# Patient Record
Sex: FEMALE | Race: WHITE | NOT HISPANIC OR LATINO | Employment: FULL TIME | URBAN - METROPOLITAN AREA
[De-identification: names, ages, dates, MRNs, and addresses within clinical notes are randomized per-mention and may not be internally consistent; named-entity substitution may affect disease eponyms.]

---

## 2019-12-27 ENCOUNTER — TRANSCRIBE ORDERS (OUTPATIENT)
Dept: OTHER | Facility: OTHER | Age: 39
End: 2019-12-27

## 2019-12-27 DIAGNOSIS — R51.9 NONINTRACTABLE HEADACHE, UNSPECIFIED CHRONICITY PATTERN, UNSPECIFIED HEADACHE TYPE: ICD-10-CM

## 2019-12-27 DIAGNOSIS — R51.9 ACUTE NONINTRACTABLE HEADACHE, UNSPECIFIED HEADACHE TYPE: Primary | ICD-10-CM

## 2020-01-09 ENCOUNTER — HOSPITAL ENCOUNTER (OUTPATIENT)
Dept: MRI IMAGING | Facility: HOSPITAL | Age: 40
Discharge: HOME | End: 2020-01-09
Admitting: FAMILY MEDICINE

## 2020-01-09 DIAGNOSIS — R51.9 ACUTE NONINTRACTABLE HEADACHE, UNSPECIFIED HEADACHE TYPE: ICD-10-CM

## 2020-01-09 PROCEDURE — 70551 MRI BRAIN STEM W/O DYE: CPT | Performed by: RADIOLOGY

## 2020-01-09 PROCEDURE — 70551 MRI BRAIN STEM W/O DYE: CPT

## 2023-03-01 ENCOUNTER — OFFICE VISIT (OUTPATIENT)
Dept: PSYCHIATRY | Facility: CLINIC | Age: 43
End: 2023-03-01
Payer: COMMERCIAL

## 2023-03-01 VITALS
BODY MASS INDEX: 21.66 KG/M2 | HEIGHT: 65 IN | DIASTOLIC BLOOD PRESSURE: 70 MMHG | WEIGHT: 130 LBS | HEART RATE: 75 BPM | SYSTOLIC BLOOD PRESSURE: 117 MMHG

## 2023-03-01 DIAGNOSIS — F41.1 GENERALIZED ANXIETY DISORDER: Primary | ICD-10-CM

## 2023-03-01 DIAGNOSIS — F33.1 MODERATE EPISODE OF RECURRENT MAJOR DEPRESSIVE DISORDER: ICD-10-CM

## 2023-03-01 PROCEDURE — 90792 PSYCH DIAG EVAL W/MED SRVCS: CPT | Performed by: NURSE PRACTITIONER

## 2023-03-01 RX ORDER — CYCLOBENZAPRINE HCL 10 MG
1 TABLET ORAL 3 TIMES DAILY
COMMUNITY
Start: 2023-01-30

## 2023-03-01 RX ORDER — CARBAMAZEPINE 200 MG/1
1 TABLET ORAL EVERY 12 HOURS SCHEDULED
COMMUNITY
Start: 2023-02-19

## 2023-03-01 RX ORDER — HYDROXYZINE HYDROCHLORIDE 25 MG/1
25 TABLET, FILM COATED ORAL 3 TIMES DAILY PRN
COMMUNITY
Start: 2023-02-19

## 2023-03-01 RX ORDER — VENLAFAXINE HYDROCHLORIDE 37.5 MG/1
37.5 CAPSULE, EXTENDED RELEASE ORAL DAILY
Qty: 30 CAPSULE | Refills: 1 | Status: SHIPPED | OUTPATIENT
Start: 2023-03-01 | End: 2023-04-03 | Stop reason: SDUPTHER

## 2023-03-01 RX ORDER — ATENOLOL AND CHLORTHALIDONE TABLET 50; 25 MG/1; MG/1
0.5 TABLET ORAL DAILY
COMMUNITY
Start: 2023-01-02

## 2023-03-01 NOTE — PROGRESS NOTES
Subjective   Chayito Parra is a 42 y.o. female who presents today for initial evaluation     Chief Complaint:  Anxiety and depression    History of Present Illness:   History of Present Illness  Chayito Parra presents to [facility] for initial evaluation.  Reports that anxiety has been present for several years, but symptoms have gotten progressively worse over the last 2 years.  Admits to feeling constantly nervous, anxious and on edge.  Also reports frequent worry, constant racing thoughts and occasionally becomes easily annoyed or irritable.  Admits that she does experience decreased interest or pleasure in activities, feels down, decreased energy and has difficulty concentrating.  Has tried medications in the past that have not been effective in providing relief of symptoms.  Currently taking Tegretol for trigeminal neuralgia, nortriptyline for migraines and hydroxyzine as needed for anxiety.  Reports that she is unable to take hydroxyzine during the day due to increased daytime sedation. Struggles to fall asleep often due to racing thoughts.  Sleeps about 5 to 7 hours per night average, but does not wake feeling rested.  Reports appetite is good. Denies any SI/HI or AVH. PHQ-9 total score: 15, APRIL-7 total score: 16    Past Psychiatric History: Reports that she has struggled with anxiety and depression for as long as she can remember.  Was started on medication shortly after the birth of her daughter about 25 years ago.  Denies any SI/HI or self harming behaviors.  Denies any previous inpatient hospitalizations.    Previous Psych Meds: Sertraline (times more than 15 years, stopped being effective), buspirone (increased anxiety), Lexapro (not effective).    Substance Use/Abuse: Denies any past or current substance use/abuse.    Past Medical/Developmental History: Denies any past developmental issues, reports meeting all milestones.  Has past medical history of  "trigeminal neuralgia, migraines and hypertension.    Family Psychiatric History: Unknown actual diagnosis, reports that mother had multiple \"nervous breakdowns\" and struggles with depression.  Has 1 first cousin with bipolar disorder.  No known mental health issues on paternal side of family.    Social History: Works full-time from home doing . Lives in [facility] with her father and helps provide care for him as he has history of cancer and is unable to care for himself independently.  Has one daughter, age 25 that lives in [place] and has a 2-month-old grandson that she is able to visit on occasion.     The following portions of the patient's history were reviewed and updated as appropriate: allergies, current medications, past family history, past medical history, past social history, past surgical history and problem list.      Past Medical History:  Past Medical History:   Diagnosis Date   • Anxiety    • Hypertension    • Migraine    • Seizures (HCC)        Social History:  Social History     Socioeconomic History   • Marital status: Single   Tobacco Use   • Smoking status: Never   • Smokeless tobacco: Never   Vaping Use   • Vaping Use: Never used   Substance and Sexual Activity   • Alcohol use: No   • Drug use: No   • Sexual activity: Defer       Family History:  History reviewed. No pertinent family history.    Past Surgical History:  Past Surgical History:   Procedure Laterality Date   •  SECTION     • HYSTERECTOMY         Problem List:  There is no problem list on file for this patient.      Allergy:   Allergies   Allergen Reactions   • Latex, Natural Rubber Itching        Current Medications:   Current Outpatient Medications   Medication Sig Dispense Refill   • atenolol-chlorthalidone (TENORETIC) 50-25 MG per tablet Take 0.5 tablets by mouth Daily.     • carBAMazepine (TEGretol) 200 MG tablet Take 1 tablet by mouth Every 12 (Twelve) Hours.     • cyclobenzaprine (FLEXERIL) 10 MG tablet " "Take 1 tablet by mouth 3 (Three) Times a Day.     • hydrOXYzine (ATARAX) 25 MG tablet Take 1 tablet by mouth 3 (Three) Times a Day As Needed. for anxiety     • nortriptyline (PAMELOR) 50 MG capsule Take 1 capsule by mouth Every Night.     • venlafaxine XR (Effexor XR) 37.5 MG 24 hr capsule Take 1 capsule by mouth Daily. 30 capsule 1     No current facility-administered medications for this visit.       Review of Symptoms:    Review of Systems   Constitutional: Negative for activity change, appetite change, fatigue, unexpected weight gain and unexpected weight loss.   Respiratory: Negative for shortness of breath.    Cardiovascular: Negative for chest pain.   Psychiatric/Behavioral: Positive for sleep disturbance and depressed mood. Negative for suicidal ideas. The patient is nervous/anxious.      Physical Exam:   Physical Exam  Vitals reviewed.   Constitutional:       General: She is not in acute distress.     Appearance: Normal appearance.   Neurological:      Mental Status: She is alert.      Gait: Gait normal.     Vitals:   Blood pressure 117/70, pulse 75, height 165.1 cm (65\"), weight 59 kg (130 lb).    Mental Status Exam:   Hygiene:   good  Cooperation:  Cooperative  Eye Contact:  Good  Psychomotor Behavior:  Appropriate  Affect:  Appropriate  Mood: normal  Hopelessness: Denies  Speech:  Normal  Thought Process:  Goal directed and Linear  Thought Content:  Mood congruent  Suicidal:  None  Homicidal:  None  Hallucinations:  None  Delusion:  None  Memory:  Intact  Orientation:  Person, Place, Time and Situation  Reliability:  good  Insight:  Good  Judgement:  Good  Impulse Control:  Good    Lab Results:   No visits with results within 6 Month(s) from this visit.   Latest known visit with results is:   Admission on 11/10/2016, Discharged on 11/10/2016   Component Date Value Ref Range Status   • Strep A Ag 11/10/2016 Negative  Negative Final   • Influenza A Ag, EIA 11/10/2016 Negative  Negative Final   • Influenza " B Ag, EIA 11/10/2016 Negative  Negative Final   • Throat Culture, Beta Strep 11/10/2016 No Beta Hemolytic Streptococcus Isolated   Final       EKG Results:  No orders to display       Assessment & Plan   Problems Addressed this Visit    None  Visit Diagnoses     Generalized anxiety disorder    -  Primary    Relevant Medications    hydrOXYzine (ATARAX) 25 MG tablet    venlafaxine XR (Effexor XR) 37.5 MG 24 hr capsule    Moderate episode of recurrent major depressive disorder (HCC)        Relevant Medications    hydrOXYzine (ATARAX) 25 MG tablet    venlafaxine XR (Effexor XR) 37.5 MG 24 hr capsule      Diagnoses       Codes Comments    Generalized anxiety disorder    -  Primary ICD-10-CM: F41.1  ICD-9-CM: 300.02     Moderate episode of recurrent major depressive disorder (HCC)     ICD-10-CM: F33.1  ICD-9-CM: 296.32           Visit Diagnoses:    ICD-10-CM ICD-9-CM   1. Generalized anxiety disorder  F41.1 300.02   2. Moderate episode of recurrent major depressive disorder (HCC)  F33.1 296.32     -Reviewed previous available documentation and most recent available labs.   MARINO reviewed and is appropriate.     -The benefits of a healthy diet and exercise were discussed with patient, especially the positive effects they have on mental health. Patient encouraged to consider lifestyle modification regarding diet and exercise patterns to maximize results of mental health treatment.     Encouraged patient to practice good sleep hygiene.  Discussed going to bed at the same time and getting up at the same time every day. Consider a quiet activity, such as reading, part of your nighttime routine. Make your bedroom a dark, comfortable place where it is easy to fall asleep. Avoid or limit caffeine consumption. Limit screen use, especially two hours prior to bed (this includes watching TV, using smartphone, tablet or computer).     -Discussed importance of counseling to decrease anxiety like symptoms. Discussed coping mechanisms  to decrease stress and anxiety: relaxation techniques, guided imagery, music therapy, staying active, support groups, diversional activities and avoid aggravating factors.  Discussed different coping mechanisms to better control depression.    Discussed plan of care and medication options with Sidra.  She struggles with increased severity of anxiety.  Having worsening racing thoughts, feeling nervous and worries.  Has also reported symptoms associated with depression that include little interest or pleasure in activities, feeling down, little energy and difficulty concentrating.  Discussed medication options, agreeable to initiate Effexor to help with overall management of anxiety as well as depression.  Sleeping between 5 to 7 hours per night, but does not wake feeling rested.  Reports appetite is good.  Denies any SI/HI or AVH.    -Start Effexor XR 37.5 mg/day for anxiety and depression    GOALS:  Short Term Goals: Patient will be compliant with medication, and patient will have no significant medication related side effects.  Patient will be engaged in psychotherapy as indicated.  Patient will report subjective improvement of symptoms.  Long term goals: To stabilize mood and treat/improve subjective symptoms, the patient will stay out of the hospital, the patient will be at an optimal level of functioning, and the patient will take all medications as prescribed.  The patient/guardian verbalized understanding and agreement with goals that were mutually set.    TREATMENT PLAN: Continue supportive psychotherapy efforts and medications as indicated for patient's diagnosis.  Pharmacological and Non-Pharmacological treatment options discussed during today's visit. Patient/Guardian acknowledged and verbally consented with current treatment plan and was educated on the importance of compliance with treatment and follow-up appointments.      MEDICATION ISSUES:  Discussed medication options and treatment plan of  prescribed medication as well as the risks, benefits, any black box warnings, and side effects including potential falls, possible impaired driving, and metabolic adversities among others. Patient is agreeable to call the office with any worsening of symptoms or onset of side effects, or if any concerns or questions arise.  The contact information for the office is made available to the patient. Patient is agreeable to call 911 or go to the nearest ER should they begin having any SI/HI, or if any urgent concerns arise. No medication side effects or related complaints today.     MEDS ORDERED DURING VISIT:  New Medications Ordered This Visit   Medications   • venlafaxine XR (Effexor XR) 37.5 MG 24 hr capsule     Sig: Take 1 capsule by mouth Daily.     Dispense:  30 capsule     Refill:  1       FOLLOW UP:  Return in about 4 weeks (around 3/29/2023) for Recheck.             This document has been electronically signed by LINDSAY Gillette  March 1, 2023 17:21 EST    Please note that portions of this note were completed with a voice recognition program. Efforts were made to edit dictation, but occasionally words are mistranscribed.

## 2023-04-02 NOTE — PROGRESS NOTES
"Subjective   Chayito Parra is a 42 y.o. female who presents today for follow up    Chief Complaint:  Anxiety and depression    History of Present Illness:   History of Present Illness  Chayito Parra presents today for medication management follow-up.  Taking Effexor XR 37.5 mg daily that was added last visit for anxiety and depression.  Reports that she has noticed overall improvement in depression and says that anxiety is \"not as bad.\"  She continues to have worry and racing thoughts with occasional irritability.  Says that anxiety often varies and depends on situations.  Admits that she no longer wakes up with a sense of dread.  Reports that if she had to rate depression it would be less than 5/10 on a scale of 0-10 with 10 being the worst.  Continues to struggle with sleep, waking up several times during the night.  Obtaining about 6 to 7 hours sleep on average at night.  Reports appetite is good.  Denies any SI/HI.  PHQ-9 total score: 8, APRIL-7 total score: 5.    Previous Psych Meds: Sertraline (times more than 15 years, stopped being effective), buspirone (increased anxiety), Lexapro (not effective).    The following portions of the patient's history were reviewed and updated as appropriate: allergies, current medications, past family history, past medical history, past social history, past surgical history and problem list.      Past Medical History:  Past Medical History:   Diagnosis Date   • Anxiety    • Depression    • Hypertension    • Migraine    • Obsessive-compulsive disorder    • Seizures        Social History:  Social History     Socioeconomic History   • Marital status: Single   Tobacco Use   • Smoking status: Never     Passive exposure: Never   • Smokeless tobacco: Never   Vaping Use   • Vaping Use: Never used   Substance and Sexual Activity   • Alcohol use: No   • Drug use: No   • Sexual activity: Not Currently     Birth control/protection: Hysterectomy       Family History:  Family History " "  Problem Relation Age of Onset   • Depression Mother        Past Surgical History:  Past Surgical History:   Procedure Laterality Date   •  SECTION     • HYSTERECTOMY         Problem List:  There is no problem list on file for this patient.      Allergy:   Allergies   Allergen Reactions   • Latex, Natural Rubber Itching        Current Medications:   Current Outpatient Medications   Medication Sig Dispense Refill   • atenolol-chlorthalidone (TENORETIC) 50-25 MG per tablet Take 0.5 tablets by mouth Daily.     • carBAMazepine (TEGretol) 200 MG tablet Take 1 tablet by mouth Every 12 (Twelve) Hours.     • cyclobenzaprine (FLEXERIL) 10 MG tablet Take 1 tablet by mouth 3 (Three) Times a Day.     • hydrOXYzine (ATARAX) 25 MG tablet Take 1 tablet by mouth 3 (Three) Times a Day As Needed. for anxiety     • nortriptyline (PAMELOR) 50 MG capsule Take 1 capsule by mouth Every Night.     • venlafaxine XR (Effexor XR) 75 MG 24 hr capsule Take 1 capsule by mouth Daily. 30 capsule 1   • traZODone (DESYREL) 50 MG tablet Take 1 tablet by mouth Every Night. 30 tablet 1     No current facility-administered medications for this visit.       Review of Symptoms:    Review of Systems   Constitutional: Negative for activity change, appetite change, fatigue, unexpected weight gain and unexpected weight loss.   Respiratory: Negative for shortness of breath.    Cardiovascular: Negative for chest pain.   Psychiatric/Behavioral: Positive for sleep disturbance and depressed mood. Negative for suicidal ideas. The patient is nervous/anxious.      Physical Exam:   Physical Exam  Vitals reviewed.   Constitutional:       General: She is not in acute distress.     Appearance: Normal appearance.   Neurological:      Mental Status: She is alert.      Gait: Gait normal.     Vitals:   Blood pressure 112/76, pulse 84, height 165.1 cm (65\"), weight 60.3 kg (133 lb).    Mental Status Exam:   Hygiene:   good  Cooperation:  Cooperative  Eye Contact:  " Good  Psychomotor Behavior:  Appropriate  Affect:  Appropriate  Mood: normal  Hopelessness: Denies  Speech:  Normal  Thought Process:  Goal directed and Linear  Thought Content:  Mood congruent  Suicidal:  None  Homicidal:  None  Hallucinations:  None  Delusion:  None  Memory:  Intact  Orientation:  Person, Place, Time and Situation  Reliability:  good  Insight:  Good  Judgement:  Good  Impulse Control:  Good    Lab Results:   No visits with results within 6 Month(s) from this visit.   Latest known visit with results is:   Admission on 11/10/2016, Discharged on 11/10/2016   Component Date Value Ref Range Status   • Strep A Ag 11/10/2016 Negative  Negative Final   • Influenza A Ag, EIA 11/10/2016 Negative  Negative Final   • Influenza B Ag, EIA 11/10/2016 Negative  Negative Final   • Throat Culture, Beta Strep 11/10/2016 No Beta Hemolytic Streptococcus Isolated   Final       EKG Results:  No orders to display       Assessment & Plan   Problems Addressed this Visit    None  Visit Diagnoses     Other insomnia    -  Primary    Relevant Medications    traZODone (DESYREL) 50 MG tablet    Generalized anxiety disorder        Relevant Medications    venlafaxine XR (Effexor XR) 75 MG 24 hr capsule    traZODone (DESYREL) 50 MG tablet    Moderate episode of recurrent major depressive disorder        Relevant Medications    venlafaxine XR (Effexor XR) 75 MG 24 hr capsule    traZODone (DESYREL) 50 MG tablet      Diagnoses       Codes Comments    Other insomnia    -  Primary ICD-10-CM: G47.09  ICD-9-CM: 780.52     Generalized anxiety disorder     ICD-10-CM: F41.1  ICD-9-CM: 300.02     Moderate episode of recurrent major depressive disorder     ICD-10-CM: F33.1  ICD-9-CM: 296.32           Visit Diagnoses:    ICD-10-CM ICD-9-CM   1. Other insomnia  G47.09 780.52   2. Generalized anxiety disorder  F41.1 300.02   3. Moderate episode of recurrent major depressive disorder  F33.1 296.32     -Reviewed previous available documentation and  most recent available labs.   MARINO reviewed and is appropriate.     -The benefits of a healthy diet and exercise were discussed with patient, especially the positive effects they have on mental health. Patient encouraged to consider lifestyle modification regarding diet and exercise patterns to maximize results of mental health treatment.     Encouraged patient to practice good sleep hygiene.  Discussed going to bed at the same time and getting up at the same time every day. Consider a quiet activity, such as reading, part of your nighttime routine. Make your bedroom a dark, comfortable place where it is easy to fall asleep. Avoid or limit caffeine consumption. Limit screen use, especially two hours prior to bed (this includes watching TV, using smartphone, tablet or computer).     -Discussed importance of counseling to decrease anxiety like symptoms. Discussed coping mechanisms to decrease stress and anxiety: relaxation techniques, guided imagery, music therapy, staying active, support groups, diversional activities and avoid aggravating factors.  Discussed different coping mechanisms to better control depression.    Sidra reports that she has noticed improvement in symptoms associated with both depression and anxiety since starting Effexor last visit.  Reports that she no longer wakes up with a sense of dread.  Continues to struggle with worry, racing thoughts and varying severity of anxiety depending on situations.  Says that she has noticed overall improvement, but does say that symptoms could be better.  Discussed plan of care and medication options.  She is agreeable to increase Effexor to XR from 37.5 to 75 mg daily.  Continues to struggle with sleep, waking up several times during the night.  Agreeable to add trazodone to help with sleep.  Denies any adverse effects of medication.    -Increase Effexor XR from 37.5 mg to 75 mg day for anxiety and depression  -Start trazodone 50 mg nightly as needed for  insomnia.    GOALS:  Short Term Goals: Patient will be compliant with medication, and patient will have no significant medication related side effects.  Patient will be engaged in psychotherapy as indicated.  Patient will report subjective improvement of symptoms.  Long term goals: To stabilize mood and treat/improve subjective symptoms, the patient will stay out of the hospital, the patient will be at an optimal level of functioning, and the patient will take all medications as prescribed.  The patient/guardian verbalized understanding and agreement with goals that were mutually set.    TREATMENT PLAN: Continue supportive psychotherapy efforts and medications as indicated for patient's diagnosis.  Pharmacological and Non-Pharmacological treatment options discussed during today's visit. Patient/Guardian acknowledged and verbally consented with current treatment plan and was educated on the importance of compliance with treatment and follow-up appointments.      MEDICATION ISSUES:  Discussed medication options and treatment plan of prescribed medication as well as the risks, benefits, any black box warnings, and side effects including potential falls, possible impaired driving, and metabolic adversities among others. Patient is agreeable to call the office with any worsening of symptoms or onset of side effects, or if any concerns or questions arise.  The contact information for the office is made available to the patient. Patient is agreeable to call 911 or go to the nearest ER should they begin having any SI/HI, or if any urgent concerns arise. No medication side effects or related complaints today.     MEDS ORDERED DURING VISIT:  New Medications Ordered This Visit   Medications   • venlafaxine XR (Effexor XR) 75 MG 24 hr capsule     Sig: Take 1 capsule by mouth Daily.     Dispense:  30 capsule     Refill:  1   • traZODone (DESYREL) 50 MG tablet     Sig: Take 1 tablet by mouth Every Night.     Dispense:  30 tablet      Refill:  1       FOLLOW UP:  Return in about 6 weeks (around 5/15/2023) for Recheck.             This document has been electronically signed by LINDSAY Gillette  April 6, 2023 10:34 EDT    Please note that portions of this note were completed with a voice recognition program. Efforts were made to edit dictation, but occasionally words are mistranscribed.

## 2023-04-03 ENCOUNTER — OFFICE VISIT (OUTPATIENT)
Dept: PSYCHIATRY | Facility: CLINIC | Age: 43
End: 2023-04-03
Payer: COMMERCIAL

## 2023-04-03 VITALS
WEIGHT: 133 LBS | DIASTOLIC BLOOD PRESSURE: 76 MMHG | SYSTOLIC BLOOD PRESSURE: 112 MMHG | HEART RATE: 84 BPM | HEIGHT: 65 IN | BODY MASS INDEX: 22.16 KG/M2

## 2023-04-03 DIAGNOSIS — F41.1 GENERALIZED ANXIETY DISORDER: ICD-10-CM

## 2023-04-03 DIAGNOSIS — F33.1 MODERATE EPISODE OF RECURRENT MAJOR DEPRESSIVE DISORDER: ICD-10-CM

## 2023-04-03 DIAGNOSIS — G47.09 OTHER INSOMNIA: Primary | ICD-10-CM

## 2023-04-03 PROCEDURE — 99214 OFFICE O/P EST MOD 30 MIN: CPT | Performed by: NURSE PRACTITIONER

## 2023-04-03 RX ORDER — TRAZODONE HYDROCHLORIDE 50 MG/1
50 TABLET ORAL NIGHTLY
Qty: 30 TABLET | Refills: 1 | Status: SHIPPED | OUTPATIENT
Start: 2023-04-03

## 2023-04-03 RX ORDER — VENLAFAXINE HYDROCHLORIDE 75 MG/1
75 CAPSULE, EXTENDED RELEASE ORAL DAILY
Qty: 30 CAPSULE | Refills: 1 | Status: SHIPPED | OUTPATIENT
Start: 2023-04-03

## 2023-05-10 ENCOUNTER — TELEMEDICINE (OUTPATIENT)
Dept: PSYCHIATRY | Facility: CLINIC | Age: 43
End: 2023-05-10
Payer: COMMERCIAL

## 2023-05-10 DIAGNOSIS — F41.1 GENERALIZED ANXIETY DISORDER: Primary | ICD-10-CM

## 2023-05-10 DIAGNOSIS — F33.0 MILD EPISODE OF RECURRENT MAJOR DEPRESSIVE DISORDER: ICD-10-CM

## 2023-05-10 DIAGNOSIS — G47.09 OTHER INSOMNIA: ICD-10-CM

## 2023-05-10 RX ORDER — HYDROXYZINE HYDROCHLORIDE 10 MG/1
10-20 TABLET, FILM COATED ORAL 3 TIMES DAILY PRN
Qty: 120 TABLET | Refills: 1 | Status: SHIPPED | OUTPATIENT
Start: 2023-05-10

## 2023-05-10 RX ORDER — VENLAFAXINE HYDROCHLORIDE 75 MG/1
75 CAPSULE, EXTENDED RELEASE ORAL DAILY
Qty: 30 CAPSULE | Refills: 2 | Status: SHIPPED | OUTPATIENT
Start: 2023-05-10

## 2023-05-10 NOTE — PROGRESS NOTES
This provider is located at [facility], Behavioral Health ,[address], [address] [place] in [place] [address], using a secure MyChart Video Visit through [employer]. Patient is being seen remotely via telehealth at their home address in [place] [address], and stated they are in a secure environment for this session. The patient's condition being diagnosed/treated is appropriate for telemedicine. The provider identified herself as well as her credentials. The patient, and/or patients guardian, consent to be seen remotely, and when consent is given they understand that the consent allows for patient identifiable information to be sent to a third party as needed. They may refuse to be seen remotely at any time. The electronic data is encrypted and password protected, and the patient and/or guardian has been advised of the potential risks to privacy not withstanding such measures.     You have chosen to receive care through a telehealth visit.  Do you consent to use a video/audio connection for your medical care today? Yes    Subjective   Chyaito Parra is a 42 y.o. female who presents today for follow up    Chief Complaint:  Anxiety and depression    History of Present Illness:   History of Present Illness  Chayito Parra presents today via MyChart video visit for medication management follow-up.  Taking Effexor XR 75 mg daily, hydroxyzine 25 mg 3 times daily as needed along with trazodone 50 mg nightly as needed.  Reports that she has noticed overall significant improvement in depression and feels that symptoms associated with depression are well controlled with medication regimen.  She does admit to increase in anxiety, attributes worsening anxiety to increased stressors with family members.  She does say that anxiety is worse some days more than others, but typically increased with triggers.  Says that she does feel medication has been helpful in decreasing anxiety overall.  Continues to feel  anxious, worry and have racing thoughts.  Rates overall anxiety 7/10 on a scale of 0-10 with 10 being the worst.  She does continue to struggle with sleep, but does say that she does not wake up as often.  Obtaining about 7 hours of sleep each night.  Verbalizes that she can only take trazodone when she does not have to work the next day due to feeling groggy the next day.     Previous Psych Meds: Sertraline (times more than 15 years, stopped being effective), buspirone (increased anxiety), Lexapro (not effective).     The following portions of the patient's history were reviewed and updated as appropriate: allergies, current medications, past family history, past medical history, past social history, past surgical history and problem list.      Past Medical History:  Past Medical History:   Diagnosis Date   • Anxiety    • Depression    • Hypertension    • Migraine    • Obsessive-compulsive disorder    • Seizures        Social History:  Social History     Socioeconomic History   • Marital status: Single   Tobacco Use   • Smoking status: Never     Passive exposure: Never   • Smokeless tobacco: Never   Vaping Use   • Vaping Use: Never used   Substance and Sexual Activity   • Alcohol use: No   • Drug use: No   • Sexual activity: Not Currently     Birth control/protection: Hysterectomy       Family History:  Family History   Problem Relation Age of Onset   • Depression Mother        Past Surgical History:  Past Surgical History:   Procedure Laterality Date   •  SECTION     • HYSTERECTOMY         Problem List:  There is no problem list on file for this patient.      Allergy:   Allergies   Allergen Reactions   • Latex, Natural Rubber Itching        Current Medications:   Current Outpatient Medications   Medication Sig Dispense Refill   • venlafaxine XR (Effexor XR) 75 MG 24 hr capsule Take 1 capsule by mouth Daily. 30 capsule 2   • atenolol-chlorthalidone (TENORETIC) 50-25 MG per tablet Take 0.5 tablets by mouth  Daily.     • carBAMazepine (TEGretol) 200 MG tablet Take 1 tablet by mouth Every 12 (Twelve) Hours.     • cyclobenzaprine (FLEXERIL) 10 MG tablet Take 1 tablet by mouth 3 (Three) Times a Day.     • hydrOXYzine (ATARAX) 10 MG tablet Take 1-2 tablets by mouth 3 (Three) Times a Day As Needed for Anxiety (anxiety and/or sleep). 120 tablet 1   • nortriptyline (PAMELOR) 50 MG capsule Take 1 capsule by mouth Every Night.     • traZODone (DESYREL) 50 MG tablet Take 1 tablet by mouth Every Night. 30 tablet 1     No current facility-administered medications for this visit.       Review of Symptoms:    Review of Systems   Constitutional: Negative for activity change, appetite change, fatigue, unexpected weight gain and unexpected weight loss.   Respiratory: Negative for shortness of breath.    Cardiovascular: Negative for chest pain.   Psychiatric/Behavioral: Positive for sleep disturbance, depressed mood and stress. Negative for suicidal ideas. The patient is nervous/anxious.      Physical Exam:   Physical Exam  Vitals reviewed.   Constitutional:       General: She is not in acute distress.     Appearance: Normal appearance.   Neurological:      Mental Status: She is alert.      Gait: Gait normal.     Vitals:   There were no vitals taken for this visit. There is no height or weight on file to calculate BMI.  Due to extenuating circumstances and possible current health risks associated with the patient being present in a clinical setting (with current health restrictions in place in regards to possible COVID 19 transmission/exposure), the patient was seen remotely today via a MyChart Video Visit through [facility].  Unable to obtain vital signs due to nature of remote visit.    Mental Status Exam:   Hygiene:   Appears good  Cooperation:  Cooperative  Eye Contact:  NATALIE  Psychomotor Behavior:  Appropriate  Affect:  Full range and Appropriate  Mood: normal  Hopelessness: Denies  Speech:  Normal  Thought Process:  Goal directed and  Linear  Thought Content:  Mood congruent  Suicidal:  None  Homicidal:  None  Hallucinations:  None  Delusion:  None  Memory:  Intact  Orientation:  Person, Place, Time and Situation  Reliability:  good  Insight:  Good  Judgement:  Good  Impulse Control:  Good    Lab Results:   No visits with results within 6 Month(s) from this visit.   Latest known visit with results is:   Admission on 11/10/2016, Discharged on 11/10/2016   Component Date Value Ref Range Status   • Strep A Ag 11/10/2016 Negative  Negative Final   • Influenza A Ag, EIA 11/10/2016 Negative  Negative Final   • Influenza B Ag, EIA 11/10/2016 Negative  Negative Final   • Throat Culture, Beta Strep 11/10/2016 No Beta Hemolytic Streptococcus Isolated   Final       EKG Results:  No orders to display       Assessment & Plan   Problems Addressed this Visit    None  Visit Diagnoses     Generalized anxiety disorder    -  Primary    Relevant Medications    hydrOXYzine (ATARAX) 10 MG tablet    venlafaxine XR (Effexor XR) 75 MG 24 hr capsule    Other insomnia        Relevant Medications    hydrOXYzine (ATARAX) 10 MG tablet    Mild episode of recurrent major depressive disorder        Relevant Medications    hydrOXYzine (ATARAX) 10 MG tablet    venlafaxine XR (Effexor XR) 75 MG 24 hr capsule      Diagnoses       Codes Comments    Generalized anxiety disorder    -  Primary ICD-10-CM: F41.1  ICD-9-CM: 300.02     Other insomnia     ICD-10-CM: G47.09  ICD-9-CM: 780.52     Mild episode of recurrent major depressive disorder     ICD-10-CM: F33.0  ICD-9-CM: 296.31           Visit Diagnoses:    ICD-10-CM ICD-9-CM   1. Generalized anxiety disorder  F41.1 300.02   2. Other insomnia  G47.09 780.52   3. Mild episode of recurrent major depressive disorder  F33.0 296.31     Sidra presents today via [employer] video visit for medication management follow-up.  Reports that she has noticed overall improvement in mood since increasing Effexor from 37.5 mg to 75 mg.  Says that  symptoms associated with depression are controlled.  She does continue to struggle with symptoms associated with anxiety. She does say that sometimes anxiety is worse than others.  Admits that worsened anxiety is sometimes triggered by stressors or situational issues.  Has also been taking hydroxyzine that she feels has been effective in decreasing severity of anxiety.  She does say that medication causes daytime fatigue and grogginess.  Reports that trazodone helps with sleep, but also causes grogginess the next day.  Says that she only takes trazodone on days when she does not work the next morning.  Discussed plan of care and medication regimen.  Agreeable to continue with Effexor XR 75 mg as previously prescribed.  Will decrease hydroxyzine from 25 to 10 mg capsule, taking 10 to 20 mg as needed.     -Refill Effexor XR 75 mg day for anxiety and depression  -Start hydroxyzine 10 to 20 mg 3 times daily as needed for anxiety/sleep  -Continue trazodone 50 mg nightly as needed for insomnia.    -Reviewed previous available documentation and most recent available labs.   MARINO reviewed per PDMP and is appropriate.     GOALS:  Short Term Goals: Patient will be compliant with medication, and patient will have no significant medication related side effects.  Patient will be engaged in psychotherapy as indicated.  Patient will report subjective improvement of symptoms.  Long term goals: To stabilize mood and treat/improve subjective symptoms, the patient will stay out of the hospital, the patient will be at an optimal level of functioning, and the patient will take all medications as prescribed.  The patient/guardian verbalized understanding and agreement with goals that were mutually set.    TREATMENT PLAN: Continue supportive psychotherapy efforts and medications as indicated for patient's diagnosis.  Pharmacological and Non-Pharmacological treatment options discussed during today's visit. Patient/Guardian acknowledged  and verbally consented with current treatment plan and was educated on the importance of compliance with treatment and follow-up appointments.      MEDICATION ISSUES:  Discussed medication options and treatment plan of prescribed medication as well as the risks, benefits, any black box warnings, and side effects including potential falls, possible impaired driving, and metabolic adversities among others. Patient is agreeable to call the office with any worsening of symptoms or onset of side effects, or if any concerns or questions arise.  The contact information for the office is made available to the patient. Patient is agreeable to call 911 or go to the nearest ER should they begin having any SI/HI, or if any urgent concerns arise. No medication side effects or related complaints today.     MEDS ORDERED DURING VISIT:  New Medications Ordered This Visit   Medications   • hydrOXYzine (ATARAX) 10 MG tablet     Sig: Take 1-2 tablets by mouth 3 (Three) Times a Day As Needed for Anxiety (anxiety and/or sleep).     Dispense:  120 tablet     Refill:  1   • venlafaxine XR (Effexor XR) 75 MG 24 hr capsule     Sig: Take 1 capsule by mouth Daily.     Dispense:  30 capsule     Refill:  2       FOLLOW UP:  Return in about 8 weeks (around 7/5/2023) for Recheck, Video visit.             This document has been electronically signed by LINDSAY Gillette  May 10, 2023 14:57 EDT    Please note that portions of this note were completed with a voice recognition program. Efforts were made to edit dictation, but occasionally words are mistranscribed.

## 2023-07-24 ENCOUNTER — TELEMEDICINE (OUTPATIENT)
Dept: PSYCHIATRY | Facility: CLINIC | Age: 43
End: 2023-07-24
Payer: COMMERCIAL

## 2023-07-24 DIAGNOSIS — G47.09 OTHER INSOMNIA: ICD-10-CM

## 2023-07-24 DIAGNOSIS — F41.1 GENERALIZED ANXIETY DISORDER: Primary | ICD-10-CM

## 2023-07-24 DIAGNOSIS — F43.21 FEELING GRIEF: ICD-10-CM

## 2023-07-24 PROCEDURE — 99214 OFFICE O/P EST MOD 30 MIN: CPT | Performed by: NURSE PRACTITIONER

## 2023-07-24 RX ORDER — CLONAZEPAM 0.5 MG/1
0.5 TABLET ORAL DAILY PRN
Qty: 15 TABLET | Refills: 0 | Status: SHIPPED | OUTPATIENT
Start: 2023-07-24 | End: 2023-08-23

## 2023-07-24 NOTE — PROGRESS NOTES
"This provider is located at [facility], Behavioral Health ,[address], [address] in [place] [address], using a secure MyChart Video Visit through [employer]. Patient is being seen remotely via telehealth at their home address in [place] [address], and stated they are in a secure environment for this session. The patient's condition being diagnosed/treated is appropriate for telemedicine. The provider identified herself as well as her credentials. The patient, and/or patients guardian, consent to be seen remotely, and when consent is given they understand that the consent allows for patient identifiable information to be sent to a third party as needed. They may refuse to be seen remotely at any time. The electronic data is encrypted and password protected, and the patient and/or guardian has been advised of the potential risks to privacy not withstanding such measures.     You have chosen to receive care through a telehealth visit.  Do you consent to use a video/audio connection for your medical care today? Yes    Subjective   Chayito Parra is a 42 y.o. female who presents today for follow up    Chief Complaint:  Anxiety and depression    History of Present Illness:   History of Present Illness  Chayito Parra presents today via MyChart video visit for medication management follow-up.  Taking Effexor XR 75 mg daily, hydroxyzine 10 to 20 mg 3 times daily as needed and trazodone 50 mg nightly as needed. Reports that trazodone typically makes her feel groggy and fatigued the next day, but has been taking medication due to difficulty falling asleep and staying asleep.  Has been obtaining only about 6 hours of sleep on a \"good night.\"  Reports that she continues to struggle with the death of her sister-in-law a couple weeks ago. Her sister-in-law has been in her life since age 10 and had a very close relationship, even being neighbors for several years.  Says that she has had increased " anxiety, feeling anxious, nervous, worry, racing thoughts and overwhelming feeling of impending doom.  Rates anxiety 6-8/10 on a 0-10 scale with 10 being the worst.  Admits to feelings of sadness given recent situation, but denies any symptoms associated with depression. Has had difficulty falling and staying asleep since the death of her sister-in-law, saying that she often wakes up during the night with the impending doom feeling.  Denies appetite changes.  Denies any SI/HI.     Previous Psych Meds: Sertraline (times more than 15 years, stopped being effective), buspirone (increased anxiety), Lexapro (not effective).     The following portions of the patient's history were reviewed and updated as appropriate: allergies, current medications, past family history, past medical history, past social history, past surgical history and problem list.      Past Medical History:  Past Medical History:   Diagnosis Date    Anxiety     Depression     Hypertension     Migraine     Obsessive-compulsive disorder     Seizures        Social History:  Social History     Socioeconomic History    Marital status: Single   Tobacco Use    Smoking status: Never     Passive exposure: Never    Smokeless tobacco: Never   Vaping Use    Vaping Use: Never used   Substance and Sexual Activity    Alcohol use: No    Drug use: No    Sexual activity: Not Currently     Birth control/protection: Hysterectomy       Family History:  Family History   Problem Relation Age of Onset    Depression Mother        Past Surgical History:  Past Surgical History:   Procedure Laterality Date     SECTION      HYSTERECTOMY         Problem List:  There is no problem list on file for this patient.      Allergy:   Allergies   Allergen Reactions    Latex, Natural Rubber Itching        Current Medications:   Current Outpatient Medications   Medication Sig Dispense Refill    atenolol-chlorthalidone (TENORETIC) 50-25 MG per tablet Take 0.5 tablets by mouth Daily.       carBAMazepine (TEGretol) 200 MG tablet Take 1 tablet by mouth Every 12 (Twelve) Hours.      clonazePAM (KlonoPIN) 0.5 MG tablet Take 1 tablet by mouth Daily As Needed for Anxiety for up to 30 days. 15 tablet 0    cyclobenzaprine (FLEXERIL) 10 MG tablet Take 1 tablet by mouth 3 (Three) Times a Day.      hydrOXYzine (ATARAX) 10 MG tablet Take 1-2 tablets by mouth 3 (Three) Times a Day As Needed for Anxiety (anxiety and/or sleep). 120 tablet 1    nortriptyline (PAMELOR) 50 MG capsule Take 1 capsule by mouth Every Night.      traZODone (DESYREL) 50 MG tablet Take 1 tablet by mouth Every Night. 30 tablet 1    venlafaxine XR (Effexor XR) 75 MG 24 hr capsule Take 1 capsule by mouth Daily. 30 capsule 2     No current facility-administered medications for this visit.       Review of Symptoms:    Review of Systems   Constitutional:  Negative for activity change, appetite change, unexpected weight gain and unexpected weight loss.   Respiratory:  Negative for shortness of breath.    Cardiovascular:  Negative for chest pain.   Psychiatric/Behavioral:  Positive for dysphoric mood, sleep disturbance and stress. Negative for suicidal ideas. The patient is nervous/anxious.      Physical Exam  Constitutional:       General: She is not in acute distress.     Appearance: Normal appearance.   Neurological:      Mental Status: She is alert.     Vitals:   There were no vitals taken for this visit. There is no height or weight on file to calculate BMI.  Due to extenuating circumstances and possible current health risks associated with the patient being present in a clinical setting (with current health restrictions in place in regards to possible COVID 19 transmission/exposure), the patient was seen remotely today via a MyChart Video Visit through [facility].  Unable to obtain vital signs due to nature of remote visit.    Mental Status Exam:   Hygiene:    Appears good  Cooperation:  Cooperative  Eye Contact:   NATALIE  Psychomotor Behavior:   Appropriate  Affect:  Full range and Appropriate  Mood: sad  Hopelessness: Denies  Speech:  Normal  Thought Process:  Goal directed and Linear  Thought Content:  Mood congruent  Suicidal:  None  Homicidal:  None  Hallucinations:  None  Delusion:  None  Memory:  Intact  Orientation:  Person, Place, Time, and Situation  Reliability:  good  Insight:  Good  Judgement:  Good  Impulse Control:  Good    Lab Results:   No visits with results within 6 Month(s) from this visit.   Latest known visit with results is:   Admission on 11/10/2016, Discharged on 11/10/2016   Component Date Value Ref Range Status    Strep A Ag 11/10/2016 Negative  Negative Final    Influenza A Ag, EIA 11/10/2016 Negative  Negative Final    Influenza B Ag, EIA 11/10/2016 Negative  Negative Final    Throat Culture, Beta Strep 11/10/2016 No Beta Hemolytic Streptococcus Isolated   Final       EKG Results:  No orders to display       Assessment & Plan   Problems Addressed this Visit    None  Visit Diagnoses       Generalized anxiety disorder    -  Primary    Relevant Medications    clonazePAM (KlonoPIN) 0.5 MG tablet    Other insomnia        Relevant Medications    clonazePAM (KlonoPIN) 0.5 MG tablet    Feeling grief              Diagnoses         Codes Comments    Generalized anxiety disorder    -  Primary ICD-10-CM: F41.1  ICD-9-CM: 300.02     Other insomnia     ICD-10-CM: G47.09  ICD-9-CM: 780.52     Feeling grief     ICD-10-CM: F43.21  ICD-9-CM: 309.0             Visit Diagnoses:    ICD-10-CM ICD-9-CM   1. Generalized anxiety disorder  F41.1 300.02   2. Other insomnia  G47.09 780.52   3. Feeling grief  F43.21 309.0     Sidra presents today for medication management follow-up via [employer] video visit.  Has been struggling with increase in anxiety, especially feelings of impending doom since the passing of her sister-in-law. They had an extremely close relationship and says that she has had difficulty dealing with her death.  Increase in anxiety has  resulted in difficulty falling and staying asleep at night.  Says that Effexor initially worked really well to control anxiety, but feels that recent events have made symptoms much worse.  Discussed plan of care and medication regimen/options.  Agreeable to continue with Effexor XR 75 mg daily, hydroxyzine 10 to 20 mg 3 times daily as needed and trazodone 50 mg nightly.  Discussed possibly taking half trazodone to help with decrease fatigue that she feels the next day with taking 50 mg.  Will also start clonazepam 0.5 mg daily as needed for severe anxiety/sleep, will provide quantity of 15 for a 30-day supply.    -Start clonazepam 0.5 mg daily as needed for severe anxiety/sleep (#15 for 30 day supply)  -Continue Effexor XR 75 mg day for anxiety and depression  -Continue hydroxyzine 10 to 20 mg 3 times daily as needed for anxiety/sleep  -Continue trazodone 50 mg nightly as needed for insomnia.    -Reviewed previous available documentation and most recent available labs.   MARINO reviewed per PDMP and is appropriate.     We have discussed potential risks and side effects of benzodiazepine use including addiction with continued use, sedation, fatigue, depression, dizziness, ataxia, slurred speech, weakness, forgetfulness, confusion, hyperexcitability, nervousness, hallucinations, rachel or hypomania, hypotension, hypersalivation, dry mouth, respiratory depression especially when taken with CNS depressants, increased risk of falls, and impaired driving. The patient is advised to not drive when taking a controlled substance.  The patient is also informed that the medication is to be used by the patient only, it is to be taken only as prescribed/directed, and to avoid any combined use of alcohol/ETOH or other substances unless prescribed and as directed by a Provider.  The patient verbalizes understanding and willingness to accept the potential side effects and risks for a better quality of life, and is currently in  compliance and agreement with the plan of care.  The patient is in agreement with the terms of the controlled substance agreement.  The patient is advised that MARINO reports will be reviewed periodically, as well as random drug screens will be performed periodically, and as needed.    GOALS:  Short Term Goals: Patient will be compliant with medication, and patient will have no significant medication related side effects.  Patient will be engaged in psychotherapy as indicated.  Patient will report subjective improvement of symptoms.  Long term goals: To stabilize mood and treat/improve subjective symptoms, the patient will stay out of the hospital, the patient will be at an optimal level of functioning, and the patient will take all medications as prescribed.  The patient/guardian verbalized understanding and agreement with goals that were mutually set.    TREATMENT PLAN: Continue supportive psychotherapy efforts and medications as indicated for patient's diagnosis.  Pharmacological and Non-Pharmacological treatment options discussed during today's visit. Patient/Guardian acknowledged and verbally consented with current treatment plan and was educated on the importance of compliance with treatment and follow-up appointments.      MEDICATION ISSUES:  Discussed medication options and treatment plan of prescribed medication as well as the risks, benefits, any black box warnings, and side effects including potential falls, possible impaired driving, and metabolic adversities among others. Patient is agreeable to call the office with any worsening of symptoms or onset of side effects, or if any concerns or questions arise.  The contact information for the office is made available to the patient. Patient is agreeable to call 911 or go to the nearest ER should they begin having any SI/HI, or if any urgent concerns arise. No medication side effects or related complaints today.     MEDS ORDERED DURING VISIT:  New Medications  Ordered This Visit   Medications    clonazePAM (KlonoPIN) 0.5 MG tablet     Sig: Take 1 tablet by mouth Daily As Needed for Anxiety for up to 30 days.     Dispense:  15 tablet     Refill:  0       FOLLOW UP:  Return in about 4 weeks (around 8/21/2023) for Recheck, Video visit.             This document has been electronically signed by LINDSAY Gillette  July 24, 2023 14:33 EDT    Please note that portions of this note were completed with a voice recognition program. Efforts were made to edit dictation, but occasionally words are mistranscribed.

## 2023-09-05 ENCOUNTER — TELEMEDICINE (OUTPATIENT)
Dept: PSYCHIATRY | Facility: CLINIC | Age: 43
End: 2023-09-05
Payer: COMMERCIAL

## 2023-09-05 DIAGNOSIS — F41.1 GENERALIZED ANXIETY DISORDER: Primary | ICD-10-CM

## 2023-09-05 DIAGNOSIS — G47.09 OTHER INSOMNIA: ICD-10-CM

## 2023-09-05 RX ORDER — VENLAFAXINE HYDROCHLORIDE 37.5 MG/1
75 CAPSULE, EXTENDED RELEASE ORAL DAILY
Qty: 60 CAPSULE | Refills: 0 | Status: SHIPPED | OUTPATIENT
Start: 2023-09-05

## 2023-09-05 RX ORDER — DIAZEPAM 5 MG/1
2.5-5 TABLET ORAL DAILY PRN
Qty: 15 TABLET | Refills: 0 | Status: SHIPPED | OUTPATIENT
Start: 2023-09-05 | End: 2024-09-04

## 2023-09-05 RX ORDER — FLUOXETINE 10 MG/1
CAPSULE ORAL
Qty: 45 CAPSULE | Refills: 0 | Status: SHIPPED | OUTPATIENT
Start: 2023-09-05 | End: 2023-10-05

## 2023-09-05 NOTE — PROGRESS NOTES
This provider is located at [facility] ,[address], [address] in [place] [address], using a secure MyChart Video Visit through [employer]. Patient is being seen remotely via telehealth at their home address in [place] [address], and stated they are in a secure environment for this session. The patient's condition being diagnosed/treated is appropriate for telemedicine. The provider identified herself as well as her credentials. The patient, and/or patients guardian, consent to be seen remotely, and when consent is given they understand that the consent allows for patient identifiable information to be sent to a third party as needed. They may refuse to be seen remotely at any time. The electronic data is encrypted and password protected, and the patient and/or guardian has been advised of the potential risks to privacy not withstanding such measures.     You have chosen to receive care through a telehealth visit.  Do you consent to use a video/audio connection for your medical care today? Yes    Subjective   Chayito Parra is a 42 y.o. female who presents today for follow up    Chief Complaint:  Anxiety and depression    History of Present Illness:   History of Present Illness  Chayito Parra presents today via MyChart video visit for medication management follow-up. Currently taking Effexor XR 75 mg daily and hydroxyzine 10 to 20 mg 3 times daily as needed. Has not been utilizing trazodone for sleep due to feeling groggy the next day. Says that she did try clonazepam for episode of severe anxiety, but medication was too sedating. Has not been able to tolerate taking clonazepam.  Continues to struggle with anxiety, especially when dealing with the death of her sister-in-law as they had a very close relationship. Also continues to help her brother and nieces.  She does report overall improvement in symptoms with medication, decreasing severity of anxiety. Denies any  current symptoms of depression. Verbalizes that she has noticed weight gain of approximately 20 pounds since starting Effexor XR. Says that while shopping for addressed recently, she typically is a size 4/6 and had to go up to a size 8. Denies any changes in appetite, but has continued to gain weight. Sleeping approximately 7 to 7.5 hours per night. Denies any SI/HI.    Previous Psych Meds: Sertraline (times more than 15 years, stopped being effective), buspirone (increased anxiety), Lexapro (not effective).     The following portions of the patient's history were reviewed and updated as appropriate: allergies, current medications, past family history, past medical history, past social history, past surgical history and problem list.      Past Medical History:  Past Medical History:   Diagnosis Date    Anxiety     Depression     Hypertension     Migraine     Obsessive-compulsive disorder     Seizures        Social History:  Social History     Socioeconomic History    Marital status: Single   Tobacco Use    Smoking status: Never     Passive exposure: Never    Smokeless tobacco: Never   Vaping Use    Vaping Use: Never used   Substance and Sexual Activity    Alcohol use: No    Drug use: No    Sexual activity: Not Currently     Birth control/protection: Hysterectomy       Family History:  Family History   Problem Relation Age of Onset    Depression Mother        Past Surgical History:  Past Surgical History:   Procedure Laterality Date     SECTION      HYSTERECTOMY         Problem List:  There is no problem list on file for this patient.      Allergy:   Allergies   Allergen Reactions    Latex, Natural Rubber Itching        Current Medications:   Current Outpatient Medications   Medication Sig Dispense Refill    atenolol-chlorthalidone (TENORETIC) 50-25 MG per tablet Take 0.5 tablets by mouth Daily.      carBAMazepine (TEGretol) 200 MG tablet Take 1 tablet by mouth Every 12 (Twelve) Hours.      cyclobenzaprine  (FLEXERIL) 10 MG tablet Take 1 tablet by mouth 3 (Three) Times a Day.      diazePAM (Valium) 5 MG tablet Take 0.5-1 tablets by mouth Daily As Needed for Anxiety or Sleep. 15 tablet 0    FLUoxetine (PROzac) 10 MG capsule Take 1 capsule by mouth Daily for 15 days, THEN 2 capsules Daily for 15 days. 45 capsule 0    hydrOXYzine (ATARAX) 10 MG tablet Take 1-2 tablets by mouth 3 (Three) Times a Day As Needed for Anxiety (anxiety and/or sleep). 120 tablet 1    nortriptyline (PAMELOR) 50 MG capsule Take 1 capsule by mouth Every Night.      venlafaxine XR (Effexor XR) 37.5 MG 24 hr capsule Take 2 capsules by mouth Daily. 60 capsule 0     No current facility-administered medications for this visit.     Review of Systems   Constitutional:  Negative for activity change, appetite change, unexpected weight gain and unexpected weight loss.   Respiratory:  Negative for shortness of breath.    Cardiovascular:  Negative for chest pain.   Psychiatric/Behavioral:  Positive for dysphoric mood, sleep disturbance and stress. Negative for suicidal ideas. The patient is nervous/anxious.      Physical Exam  Constitutional:       General: She is not in acute distress.     Appearance: Normal appearance.   Neurological:      Mental Status: She is alert.     Vitals:   There were no vitals taken for this visit. There is no height or weight on file to calculate BMI.  Due to extenuating circumstances and possible current health risks associated with the patient being present in a clinical setting (with current health restrictions in place in regards to possible COVID 19 transmission/exposure), the patient was seen remotely today via a MyChart Video Visit through [facility].  Unable to obtain vital signs due to nature of remote visit.    Mental Status Exam:   Hygiene:    Appears good  Cooperation:  Cooperative  Eye Contact:   NATALIE  Psychomotor Behavior:  Appropriate  Affect:  Full range and Appropriate  Mood: sad and anxious  Hopelessness: Denies  Speech:   Normal  Thought Process:  Goal directed and Linear  Thought Content:  Mood congruent  Suicidal:  None  Homicidal:  None  Hallucinations:  None  Delusion:  None  Memory:  Intact  Orientation:  Person, Place, Time, and Situation  Reliability:  good  Insight:  Good  Judgement:  Good  Impulse Control:  Good    Lab Results:   No visits with results within 6 Month(s) from this visit.   Latest known visit with results is:   Admission on 11/10/2016, Discharged on 11/10/2016   Component Date Value Ref Range Status    Strep A Ag 11/10/2016 Negative  Negative Final    Influenza A Ag, EIA 11/10/2016 Negative  Negative Final    Influenza B Ag, EIA 11/10/2016 Negative  Negative Final    Throat Culture, Beta Strep 11/10/2016 No Beta Hemolytic Streptococcus Isolated   Final       EKG Results:  No orders to display       Assessment & Plan   Problems Addressed this Visit    None  Visit Diagnoses       Generalized anxiety disorder    -  Primary    Relevant Medications    diazePAM (Valium) 5 MG tablet    FLUoxetine (PROzac) 10 MG capsule    venlafaxine XR (Effexor XR) 37.5 MG 24 hr capsule    Other insomnia              Diagnoses         Codes Comments    Generalized anxiety disorder    -  Primary ICD-10-CM: F41.1  ICD-9-CM: 300.02     Other insomnia     ICD-10-CM: G47.09  ICD-9-CM: 780.52             Visit Diagnoses:    ICD-10-CM ICD-9-CM   1. Generalized anxiety disorder  F41.1 300.02   2. Other insomnia  G47.09 780.52     Sid[facility] today [facility].  Continues to struggle with anxiety, but severity has decreased significantly since starting Effexor XR.  Also continues to deal with the death of her sister-in-law, helping her brother and nieces. Verbalizes that she has noticed a 20 pound weight gain since starting medication, but has not changed her diet. Says that she did take clonazepam when anxiety became severe, but felt medication was too sedating.  Has been utilizing  hydroxyzine at night as needed. Discussed plan and medication regimen/options.  Agreeable to change Effexor XR, slowly tapering medication to discontinue. Will then start Prozac 10 mg daily x 15 days, then increase to 20 mg daily. Will continue with hydroxyzine 10 to 20 mg 3 times daily as needed for anxiety/sleep. Will also discontinue clonazepam and start diazepam 2.5 to 5 mg daily as needed for severe anxiety as we transition medications. Discussed medication taper schedule in detail, will continue with tapering Effexor XR over the next 4 weeks. Sent chart via email to further help with taper schedule.    -Discussed taper schedule of Effexor XR 75 mg to 37.5 mg over 4 weeks, then start Prozac 10 mg daily  -Start Prozac 10 mg daily x 15 days, then increase to 20 mg daily  -Start diazepam 2.5 to 5 mg daily as needed for severe anxiety (#15 for 30-day supply)  -Continue hydroxyzine 10 - 20 mg 3 times daily as needed for anxiety/sleep    -Reviewed previous available documentation and most recent available labs.   MARINO reviewed per PDMP and is appropriate.     We have discussed potential risks and side effects of benzodiazepine use including addiction with continued use, sedation, fatigue, depression, dizziness, ataxia, slurred speech, weakness, forgetfulness, confusion, hyperexcitability, nervousness, hallucinations, rachel or hypomania, hypotension, hypersalivation, dry mouth, respiratory depression especially when taken with CNS depressants, increased risk of falls, and impaired driving. The patient is advised to not drive when taking a controlled substance.  The patient is also informed that the medication is to be used by the patient only, it is to be taken only as prescribed/directed, and to avoid any combined use of alcohol/ETOH or other substances unless prescribed and as directed by a Provider.  The patient verbalizes understanding and willingness to accept the potential side effects and risks for a better  quality of life, and is currently in compliance and agreement with the plan of care.  The patient is in agreement with the terms of the controlled substance agreement.  The patient is advised that MARINO reports will be reviewed periodically, as well as random drug screens will be performed periodically, and as needed.    GOALS:  Short Term Goals: Patient will be compliant with medication, and patient will have no significant medication related side effects.  Patient will be engaged in psychotherapy as indicated.  Patient will report subjective improvement of symptoms.  Long term goals: To stabilize mood and treat/improve subjective symptoms, the patient will stay out of the hospital, the patient will be at an optimal level of functioning, and the patient will take all medications as prescribed.  The patient/guardian verbalized understanding and agreement with goals that were mutually set.    TREATMENT PLAN: Continue supportive psychotherapy efforts and medications as indicated for patient's diagnosis.  Pharmacological and Non-Pharmacological treatment options discussed during today's visit. Patient/Guardian acknowledged and verbally consented with current treatment plan and was educated on the importance of compliance with treatment and follow-up appointments.      MEDICATION ISSUES:  Discussed medication options and treatment plan of prescribed medication as well as the risks, benefits, any black box warnings, and side effects including potential falls, possible impaired driving, and metabolic adversities among others. Patient is agreeable to call the office with any worsening of symptoms or onset of side effects, or if any concerns or questions arise.  The contact information for the office is made available to the patient. Patient is agreeable to call 911 or go to the nearest ER should they begin having any SI/HI, or if any urgent concerns arise. No medication side effects or related complaints today.     MEDS  ORDERED DURING VISIT:  New Medications Ordered This Visit   Medications    diazePAM (Valium) 5 MG tablet     Sig: Take 0.5-1 tablets by mouth Daily As Needed for Anxiety or Sleep.     Dispense:  15 tablet     Refill:  0    FLUoxetine (PROzac) 10 MG capsule     Sig: Take 1 capsule by mouth Daily for 15 days, THEN 2 capsules Daily for 15 days.     Dispense:  45 capsule     Refill:  0     Discussed start date/plan with tapering Venlafaxine    venlafaxine XR (Effexor XR) 37.5 MG 24 hr capsule     Sig: Take 2 capsules by mouth Daily.     Dispense:  60 capsule     Refill:  0     Tapering dose       FOLLOW UP:  Return in about 8 weeks (around 10/31/2023) for Recheck, Video visit.             This document has been electronically signed by LINDSAY Gillette  September 5, 2023 17:00 EDT    Please note that portions of this note were completed with a voice recognition program. Efforts were made to edit dictation, but occasionally words are mistranscribed.

## 2023-09-18 ENCOUNTER — TELEPHONE (OUTPATIENT)
Dept: PSYCHIATRY | Facility: CLINIC | Age: 43
End: 2023-09-18
Payer: COMMERCIAL

## 2023-09-18 DIAGNOSIS — G47.09 OTHER INSOMNIA: ICD-10-CM

## 2023-09-18 DIAGNOSIS — F41.1 GENERALIZED ANXIETY DISORDER: ICD-10-CM

## 2023-09-18 NOTE — TELEPHONE ENCOUNTER
Patient states that she was put on diazepam and is out. She said she has been taking this daily. And is out of hydroxyzine as well. She said past week a lot has happened and feeling more anxious than she was.    Rx Refill Note  Requested Prescriptions     Pending Prescriptions Disp Refills    diazePAM (Valium) 5 MG tablet 15 tablet 0     Sig: Take 0.5-1 tablets by mouth Daily As Needed for Anxiety or Sleep.    hydrOXYzine (ATARAX) 10 MG tablet 120 tablet 1     Sig: Take 1-2 tablets by mouth 3 (Three) Times a Day As Needed for Anxiety (anxiety and/or sleep).      Last office visit with prescribing clinician: 4/3/2023   Last telemedicine visit with prescribing clinician: 9/5/2023   Next office visit with prescribing clinician: 10/31/2023                         Would you like a call back once the refill request has been completed: [] Yes [] No    If the office needs to give you a call back, can they leave a voicemail: [] Yes [] No    Daisy Rubio CMA  09/18/23, 15:48 EDT

## 2023-09-19 RX ORDER — HYDROXYZINE HYDROCHLORIDE 25 MG/1
25 TABLET, FILM COATED ORAL 3 TIMES DAILY PRN
COMMUNITY
Start: 2023-09-04

## 2023-09-19 RX ORDER — HYDROXYZINE HYDROCHLORIDE 10 MG/1
10-20 TABLET, FILM COATED ORAL 3 TIMES DAILY PRN
Qty: 120 TABLET | Refills: 1 | Status: SHIPPED | OUTPATIENT
Start: 2023-09-19

## 2023-09-19 RX ORDER — DIAZEPAM 5 MG/1
2.5-5 TABLET ORAL DAILY PRN
Qty: 15 TABLET | Refills: 0 | Status: SHIPPED | OUTPATIENT
Start: 2023-09-19 | End: 2024-09-18

## 2023-09-19 NOTE — TELEPHONE ENCOUNTER
Is she still taking medication? Was last sent in May with one refill, so should have been out since July.

## 2023-09-19 NOTE — TELEPHONE ENCOUNTER
Will send to pharmacy on file. Hydroxyzine sent as previously prescribed (10-20 mg 3 times daily prn).

## 2023-09-19 NOTE — TELEPHONE ENCOUNTER
Patient states she don't remember how it fell but she had some until about a week or so ago. She had two different mg 10 mg and a 25 mg for night time. Her PCP gave her the 25 mg I uploaded it in the med list also updated med list. Patient is on the week before of increasing to 20 mg of Prozac.

## 2023-10-20 ENCOUNTER — HOSPITAL ENCOUNTER (EMERGENCY)
Facility: HOSPITAL | Age: 43
Discharge: HOME | End: 2023-10-20
Attending: EMERGENCY MEDICINE
Payer: COMMERCIAL

## 2023-10-20 ENCOUNTER — APPOINTMENT (OUTPATIENT)
Dept: GENERAL RADIOLOGY | Facility: HOSPITAL | Age: 43
End: 2023-10-20
Payer: COMMERCIAL

## 2023-10-20 VITALS
RESPIRATION RATE: 18 BRPM | OXYGEN SATURATION: 98 % | SYSTOLIC BLOOD PRESSURE: 118 MMHG | HEIGHT: 64 IN | DIASTOLIC BLOOD PRESSURE: 80 MMHG | HEART RATE: 73 BPM | BODY MASS INDEX: 23.9 KG/M2 | TEMPERATURE: 97.9 F | WEIGHT: 140 LBS

## 2023-10-20 DIAGNOSIS — S42.302A CLOSED FRACTURE OF SHAFT OF LEFT HUMERUS, UNSPECIFIED FRACTURE MORPHOLOGY, INITIAL ENCOUNTER: Primary | ICD-10-CM

## 2023-10-20 PROCEDURE — 96372 THER/PROPH/DIAG INJ SC/IM: CPT

## 2023-10-20 PROCEDURE — 73030 X-RAY EXAM OF SHOULDER: CPT | Performed by: RADIOLOGY

## 2023-10-20 PROCEDURE — 99283 EMERGENCY DEPT VISIT LOW MDM: CPT

## 2023-10-20 PROCEDURE — 73030 X-RAY EXAM OF SHOULDER: CPT

## 2023-10-20 PROCEDURE — 25010000002 HYDROMORPHONE 1 MG/ML SOLUTION: Performed by: NURSE PRACTITIONER

## 2023-10-20 PROCEDURE — 73060 X-RAY EXAM OF HUMERUS: CPT | Performed by: RADIOLOGY

## 2023-10-20 PROCEDURE — 73060 X-RAY EXAM OF HUMERUS: CPT

## 2023-10-20 RX ORDER — HYDROCODONE BITARTRATE AND ACETAMINOPHEN 5; 325 MG/1; MG/1
1 TABLET ORAL EVERY 8 HOURS PRN
Qty: 9 TABLET | Refills: 0 | Status: SHIPPED | OUTPATIENT
Start: 2023-10-20 | End: 2023-10-23

## 2023-10-20 RX ORDER — HYDROCODONE BITARTRATE AND ACETAMINOPHEN 5; 325 MG/1; MG/1
1 TABLET ORAL ONCE
Status: COMPLETED | OUTPATIENT
Start: 2023-10-20 | End: 2023-10-20

## 2023-10-20 RX ADMIN — HYDROMORPHONE HYDROCHLORIDE 1 MG: 1 INJECTION, SOLUTION INTRAMUSCULAR; INTRAVENOUS; SUBCUTANEOUS at 04:53

## 2023-10-20 RX ADMIN — HYDROCODONE BITARTRATE AND ACETAMINOPHEN 1 TABLET: 5; 325 TABLET ORAL at 03:10

## 2023-10-20 NOTE — ED NOTES
Applied Posterior Long Arm splint using 1 roll of 4 inch cotton wrap, 25 inches of 3 inch Ortho Glass, and 2 roll 4 inch ACE wrap. Pt tolerated well. Capillary refill less than 3 seconds.

## 2023-10-20 NOTE — ED NOTES
Patient reports falling and hurting her left arm, patient states that she did not hit her head or lose consciousness. Patient reports no numbness or tingling in the arm or hand. Pain is in the upper arm not going above the shoulder or below the elbow.

## 2023-10-23 NOTE — ED PROVIDER NOTES
Subjective   History of Present Illness  Patient is a 42 year old female with PMH significant for anxiety, depression, hypertension, migraines and OCD. She presents to the ED for left arm pain. She reports she fell at home, landing on her left side. Denies any other injuries or complaints.        Review of Systems   Constitutional: Negative.  Negative for fever.   HENT: Negative.     Eyes: Negative.    Respiratory: Negative.     Cardiovascular: Negative.  Negative for chest pain.   Gastrointestinal: Negative.  Negative for abdominal pain.   Endocrine: Negative.    Genitourinary: Negative.  Negative for dysuria.   Musculoskeletal:         Positive for left arm pain   Skin: Negative.    Neurological: Negative.    Psychiatric/Behavioral: Negative.     All other systems reviewed and are negative.      Past Medical History:   Diagnosis Date    Anxiety     Depression     Hypertension     Migraine     Obsessive-compulsive disorder     Seizures        Allergies   Allergen Reactions    Latex, Natural Rubber Itching       Past Surgical History:   Procedure Laterality Date     SECTION      HYSTERECTOMY         Family History   Problem Relation Age of Onset    Depression Mother        Social History     Socioeconomic History    Marital status: Single   Tobacco Use    Smoking status: Never     Passive exposure: Never    Smokeless tobacco: Never   Vaping Use    Vaping Use: Never used   Substance and Sexual Activity    Alcohol use: No    Drug use: No    Sexual activity: Not Currently     Birth control/protection: Hysterectomy           Objective   Physical Exam  Vitals and nursing note reviewed.   Constitutional:       General: She is not in acute distress.     Appearance: She is well-developed. She is not diaphoretic.   HENT:      Head: Normocephalic and atraumatic.      Right Ear: External ear normal.      Left Ear: External ear normal.      Nose: Nose normal.   Eyes:      Conjunctiva/sclera: Conjunctivae normal.       Pupils: Pupils are equal, round, and reactive to light.   Neck:      Vascular: No JVD.      Trachea: No tracheal deviation.   Cardiovascular:      Rate and Rhythm: Normal rate and regular rhythm.      Heart sounds: Normal heart sounds. No murmur heard.  Pulmonary:      Effort: Pulmonary effort is normal. No respiratory distress.      Breath sounds: Normal breath sounds. No wheezing.   Abdominal:      General: Bowel sounds are normal.      Palpations: Abdomen is soft.      Tenderness: There is no abdominal tenderness.   Musculoskeletal:         General: No deformity. Normal range of motion.      Right upper arm: Swelling, edema and tenderness present.      Right forearm: Tenderness present.      Cervical back: Normal range of motion and neck supple.   Skin:     General: Skin is warm and dry.      Coloration: Skin is not pale.      Findings: No erythema or rash.   Neurological:      Mental Status: She is alert and oriented to person, place, and time.      Cranial Nerves: No cranial nerve deficit.   Psychiatric:         Behavior: Behavior normal.         Thought Content: Thought content normal.         Procedures       XR Shoulder 2+ View Left   Final Result       ACUTE NONDISPLACED FRACTURE OF THE LEFT HUMERAL NECK.                   This report was finalized on 10/20/2023 3:58 AM by Valentina Walker MD.          XR Humerus Left   Final Result       ACUTE NONDISPLACED FRACTURE OF THE LEFT HUMERAL NECK.                   This report was finalized on 10/20/2023 3:58 AM by Valentina Walker MD.               ED Course  ED Course as of 10/23/23 1249   Fri Oct 20, 2023   0408 XR Humerus Left  IMPRESSION:     ACUTE NONDISPLACED FRACTURE OF THE LEFT HUMERAL NECK.      [MB]   0408 XR Shoulder 2+ View Left  IMPRESSION:     ACUTE NONDISPLACED FRACTURE OF THE LEFT HUMERAL NECK.   [MB]      ED Course User Index  [MB] Belinda García APRN                                           Medical Decision Making  Problems Addressed:  Closed  fracture of shaft of left humerus, unspecified fracture morphology, initial encounter: complicated acute illness or injury    Amount and/or Complexity of Data Reviewed  Radiology: ordered. Decision-making details documented in ED Course.    Risk  Prescription drug management.        Final diagnoses:   Closed fracture of shaft of left humerus, unspecified fracture morphology, initial encounter       ED Disposition  ED Disposition       ED Disposition   Discharge    Condition   Stable    Comment   --               Bandar Kraft, DO  [address]  [address]  [place] [id number] [phone]    Call in 2 days           Medication List        New Prescriptions      HYDROcodone-acetaminophen 5-325 MG per tablet  Commonly known as: NORCO  Take 1 tablet by mouth Every 8 (Eight) Hours As Needed for Moderate Pain for up to 3 days.               Where to Get Your Medications        These medications were sent to [facility] [address] [place] [address] - [address] [place] [address] [phone]  - [phone] [address] [address]      Phone: [phone]   HYDROcodone-acetaminophen 5-325 MG per tablet            Belinda García, APRN  10/23/23 1246

## 2023-10-31 ENCOUNTER — TELEMEDICINE (OUTPATIENT)
Dept: PSYCHIATRY | Facility: CLINIC | Age: 43
End: 2023-10-31
Payer: COMMERCIAL

## 2023-10-31 DIAGNOSIS — F33.0 MILD EPISODE OF RECURRENT MAJOR DEPRESSIVE DISORDER: Primary | ICD-10-CM

## 2023-10-31 DIAGNOSIS — G47.09 OTHER INSOMNIA: ICD-10-CM

## 2023-10-31 DIAGNOSIS — F41.1 GENERALIZED ANXIETY DISORDER: ICD-10-CM

## 2023-10-31 RX ORDER — FLUOXETINE HYDROCHLORIDE 20 MG/1
20 CAPSULE ORAL DAILY
Qty: 90 CAPSULE | Refills: 0 | Status: SHIPPED | OUTPATIENT
Start: 2023-10-31

## 2023-10-31 RX ORDER — DIAZEPAM 5 MG/1
2.5-5 TABLET ORAL DAILY PRN
Qty: 15 TABLET | Refills: 0 | Status: SHIPPED | OUTPATIENT
Start: 2023-10-31 | End: 2024-10-30

## 2023-10-31 NOTE — PROGRESS NOTES
This provider is located at [facility], Behavioral Health ,[address], [address] in [place] [address], using a secure MyChart Video Visit through [employer]. Patient is being seen remotely via telehealth at their home address in [place] [address], and stated they are in a secure environment for this session. The patient's condition being diagnosed/treated is appropriate for telemedicine. The provider identified herself as well as her credentials. The patient, and/or patients guardian, consent to be seen remotely, and when consent is given they understand that the consent allows for patient identifiable information to be sent to a third party as needed. They may refuse to be seen remotely at any time. The electronic data is encrypted and password protected, and the patient and/or guardian has been advised of the potential risks to privacy not withstanding such measures.     You have chosen to receive care through a telehealth visit.  Do you consent to use a video/audio connection for your medical care today? Yes    Subjective   Chayito Parra is a 42 y.o. female who presents today for follow up    Chief Complaint:  Anxiety and depression    History of Present Illness:   History of Present Illness  Chayito Parra presents today via MyChart video visit for medication management follow-up.  Currently taking Prozac 20 mg daily, hydroxyzine 10 to 20 mg daily and diazepam 2.5 to 5 mg as needed for severe anxiety with limited quantity (#15) provided.  Was recently on Effexor XR, but changed to Prozac after 20 pound weight gain associated with Effexor.  Has experienced increased stressors recently, fell almost 2 weeks ago breaking left upper extremity. Also had personal issues with daughter getting  without including her and not being allowed to see her grandchild. Voices that anxiety is controlled overall while at home, but does increase on occasion. Says that sometimes she becomes overly  "anxious daily, but feels as though anxiety has improved as she is not in a \"constant state of anxiety.\" Rates overall anxiety when episodes occur 7/10 on a 0-10 scale with 10 being the worst. Denies depressive symptoms being problematic at this time. Denies any appetite changes. Denies any SI/HI.    Previous Psych Meds: Sertraline (times more than 15 years, stopped being effective), buspirone (increased anxiety), Lexapro (not effective), Effexor XR (weight gain).     The following portions of the patient's history were reviewed and updated as appropriate: allergies, current medications, past family history, past medical history, past social history, past surgical history and problem list.      Past Medical History:  Past Medical History:   Diagnosis Date    Anxiety     Depression     Hypertension     Migraine     Obsessive-compulsive disorder     Seizures        Social History:  Social History     Socioeconomic History    Marital status: Single   Tobacco Use    Smoking status: Never     Passive exposure: Never    Smokeless tobacco: Never   Vaping Use    Vaping Use: Never used   Substance and Sexual Activity    Alcohol use: No    Drug use: No    Sexual activity: Not Currently     Birth control/protection: Hysterectomy       Family History:  Family History   Problem Relation Age of Onset    Depression Mother        Past Surgical History:  Past Surgical History:   Procedure Laterality Date     SECTION      HYSTERECTOMY         Problem List:  There is no problem list on file for this patient.      Allergy:   Allergies   Allergen Reactions    Latex, Natural Rubber Itching        Current Medications:   Current Outpatient Medications   Medication Sig Dispense Refill    diazePAM (Valium) 5 MG tablet Take 0.5-1 tablets by mouth Daily As Needed for Anxiety. 15 tablet 0    atenolol-chlorthalidone (TENORETIC) 50-25 MG per tablet Take 0.5 tablets by mouth Daily.      carBAMazepine (TEGretol) 200 MG tablet Take 1 tablet " by mouth Every 12 (Twelve) Hours.      cyclobenzaprine (FLEXERIL) 10 MG tablet Take 1 tablet by mouth 3 (Three) Times a Day.      FLUoxetine (PROzac) 20 MG capsule Take 1 capsule by mouth Daily. 90 capsule 0    hydrOXYzine (ATARAX) 10 MG tablet Take 1-2 tablets by mouth 3 (Three) Times a Day As Needed for Anxiety (anxiety and/or sleep). 120 tablet 1    hydrOXYzine (ATARAX) 25 MG tablet Take 1 tablet by mouth 3 (Three) Times a Day As Needed. for anxiety      nortriptyline (PAMELOR) 50 MG capsule Take 1 capsule by mouth Every Night.       No current facility-administered medications for this visit.     Review of Systems   Constitutional:  Negative for activity change, appetite change, unexpected weight gain and unexpected weight loss.   Respiratory:  Negative for shortness of breath.    Cardiovascular:  Negative for chest pain.   Psychiatric/Behavioral:  Positive for dysphoric mood, sleep disturbance and stress. Negative for suicidal ideas. The patient is nervous/anxious.      Physical Exam  Constitutional:       General: She is not in acute distress.     Appearance: Normal appearance.   Neurological:      Mental Status: She is alert.     Vitals:   The patient was seen remotely today via a MyChart Video Visit through [facility].  Unable to obtain vital signs due to nature of remote visit.    Mental Status Exam:   Hygiene:    Appears good  Cooperation:  Cooperative  Eye Contact:   NATALIE  Psychomotor Behavior:  Appropriate  Affect:  Full range and Appropriate  Mood: sad and anxious  Hopelessness: Denies  Speech:  Normal  Thought Process:  Goal directed and Linear  Thought Content:  Mood congruent  Suicidal:  None  Homicidal:  None  Hallucinations:  None  Delusion:  None  Memory:  Intact  Orientation:  Person, Place, Time, and Situation  Reliability:  good  Insight:  Good  Judgement:  Good  Impulse Control:  Good    Lab Results:   No visits with results within 6 Month(s) from this visit.   Latest known visit with results is:    Admission on 11/10/2016, Discharged on 11/10/2016   Component Date Value Ref Range Status    Strep A Ag 11/10/2016 Negative  Negative Final    Influenza A Ag, EIA 11/10/2016 Negative  Negative Final    Influenza B Ag, EIA 11/10/2016 Negative  Negative Final    Throat Culture, Beta Strep 11/10/2016 No Beta Hemolytic Streptococcus Isolated   Final       EKG Results:  No orders to display       Assessment & Plan   Problems Addressed this Visit    None  Visit Diagnoses       Mild episode of recurrent major depressive disorder    -  Primary    Relevant Medications    diazePAM (Valium) 5 MG tablet    FLUoxetine (PROzac) 20 MG capsule    Generalized anxiety disorder        Relevant Medications    diazePAM (Valium) 5 MG tablet    FLUoxetine (PROzac) 20 MG capsule    Other insomnia              Diagnoses         Codes Comments    Mild episode of recurrent major depressive disorder    -  Primary ICD-10-CM: F33.0  ICD-9-CM: 296.31     Generalized anxiety disorder     ICD-10-CM: F41.1  ICD-9-CM: 300.02     Other insomnia     ICD-10-CM: G47.09  ICD-9-CM: 780.52             Visit Diagnoses:    ICD-10-CM ICD-9-CM   1. Mild episode of recurrent major depressive disorder  F33.0 296.31   2. Generalized anxiety disorder  F41.1 300.02   3. Other insomnia  G47.09 780.52     Sidra presents today via [employer] video visit for medication management follow-up.  Feels that overall depressive symptoms are well controlled as she does not feel depression has been problematic.  She does continue to struggle with constant anxiety.  Has also had increased stressors that have likely contributed to increased anxiety.  Voices interest in continuing with medications as previously prescribed.  Feels that medication has helped overall with decreasing severity of symptoms.  Will continue with Prozac 20 mg daily and diazepam 2.5 to 5 mg as needed for severe anxiety.  Will also continue hydroxyzine 10 to 20 mg 3 times daily as needed for  anxiety/sleep.    -Refill Prozac 20 mg daily for anxiety and depression  -Refill diazepam 2.5 to 5 mg daily as needed for severe anxiety (#15 for 30-day supply)  -Continue hydroxyzine 10 - 20 mg 3 times daily as needed for anxiety/sleep    -Reviewed previous available documentation and most recent available labs. MARINO reviewed per PDMP and is appropriate.     GOALS:  Short Term Goals: Patient will be compliant with medication, and patient will have no significant medication related side effects.  Patient will be engaged in psychotherapy as indicated.  Patient will report subjective improvement of symptoms.  Long term goals: To stabilize mood and treat/improve subjective symptoms, the patient will stay out of the hospital, the patient will be at an optimal level of functioning, and the patient will take all medications as prescribed.  The patient/guardian verbalized understanding and agreement with goals that were mutually set.    TREATMENT PLAN: Continue supportive psychotherapy efforts and medications as indicated for patient's diagnosis.  Pharmacological and Non-Pharmacological treatment options discussed during today's visit. Patient/Guardian acknowledged and verbally consented with current treatment plan and was educated on the importance of compliance with treatment and follow-up appointments.      MEDICATION ISSUES:  Discussed medication options and treatment plan of prescribed medication as well as the risks, benefits, any black box warnings, and side effects including potential falls, possible impaired driving, and metabolic adversities among others. Patient is agreeable to call the office with any worsening of symptoms or onset of side effects, or if any concerns or questions arise.  The contact information for the office is made available to the patient. Patient is agreeable to call 911 or go to the nearest ER should they begin having any SI/HI, or if any urgent concerns arise. No medication side effects  or related complaints today.     We have discussed potential risks and side effects of benzodiazepine use including addiction with continued use, sedation, fatigue, depression, dizziness, ataxia, slurred speech, weakness, forgetfulness, confusion, hyperexcitability, nervousness, hallucinations, rachel or hypomania, hypotension, hypersalivation, dry mouth, respiratory depression especially when taken with CNS depressants, increased risk of falls, and impaired driving. The patient is advised to not drive when taking a controlled substance.  The patient is also informed that the medication is to be used by the patient only, it is to be taken only as prescribed/directed, and to avoid any combined use of alcohol/ETOH or other substances unless prescribed and as directed by a Provider.  The patient verbalizes understanding and willingness to accept the potential side effects and risks for a better quality of life, and is currently in compliance and agreement with the plan of care.  The patient is in agreement with the terms of the controlled substance agreement.  The patient is advised that MARINO reports will be reviewed periodically, as well as random drug screens will be performed periodically, and as needed.    MEDS ORDERED DURING VISIT:  New Medications Ordered This Visit   Medications    diazePAM (Valium) 5 MG tablet     Sig: Take 0.5-1 tablets by mouth Daily As Needed for Anxiety.     Dispense:  15 tablet     Refill:  0    FLUoxetine (PROzac) 20 MG capsule     Sig: Take 1 capsule by mouth Daily.     Dispense:  90 capsule     Refill:  0       FOLLOW UP:  Return in about 8 weeks (around 12/26/2023) for Recheck, Video visit.             This document has been electronically signed by LINDSAY Gillette  October 31, 2023 15:45 EDT    Please note that portions of this note were completed with a voice recognition program. Efforts were made to edit dictation, but occasionally words are mistranscribed.

## 2023-12-05 DIAGNOSIS — F41.1 GENERALIZED ANXIETY DISORDER: ICD-10-CM

## 2023-12-05 DIAGNOSIS — Z79.899 MEDICATION MANAGEMENT: ICD-10-CM

## 2023-12-05 DIAGNOSIS — F33.1 MODERATE EPISODE OF RECURRENT MAJOR DEPRESSIVE DISORDER: ICD-10-CM

## 2023-12-05 DIAGNOSIS — F41.1 GENERALIZED ANXIETY DISORDER: Primary | ICD-10-CM

## 2023-12-05 RX ORDER — DIAZEPAM 5 MG/1
TABLET ORAL
Qty: 15 TABLET | Refills: 0 | OUTPATIENT
Start: 2023-12-05

## 2023-12-05 NOTE — TELEPHONE ENCOUNTER
Needs appointment, UDS, and CSA. Pt unreachable. Will wait for pt to call about medication before taking any further action.

## 2023-12-05 NOTE — TELEPHONE ENCOUNTER
Pt needs appointment for refills. Pt needs UDS and CSA. UDS order placed, [employer] message sent containing directions and a copy of the CSA.

## 2023-12-14 DIAGNOSIS — G47.09 OTHER INSOMNIA: ICD-10-CM

## 2023-12-14 DIAGNOSIS — F41.1 GENERALIZED ANXIETY DISORDER: ICD-10-CM

## 2023-12-14 RX ORDER — HYDROXYZINE HYDROCHLORIDE 10 MG/1
TABLET, FILM COATED ORAL
Qty: 120 TABLET | Refills: 0 | Status: SHIPPED | OUTPATIENT
Start: 2023-12-14

## 2023-12-14 NOTE — TELEPHONE ENCOUNTER
Pt called and stated that she didn't know why that both were in there. Stated that she no longer takes the 25mg, just the 10mg prescribed by Zina. Has an appointment scheduled now. Would like the 10mg Hydroxyzine sent in.

## 2023-12-14 NOTE — TELEPHONE ENCOUNTER
It appears pt is getting Hydroxyzine from 2 different providers. Pt has not returned any previous attempts to contact her. Pt continues to need a UDS and CSA. Please advise.

## 2024-01-10 ENCOUNTER — TELEMEDICINE (OUTPATIENT)
Dept: PSYCHIATRY | Facility: CLINIC | Age: 44
End: 2024-01-10
Payer: COMMERCIAL

## 2024-01-10 DIAGNOSIS — F41.1 GENERALIZED ANXIETY DISORDER: ICD-10-CM

## 2024-01-10 DIAGNOSIS — F33.0 MILD EPISODE OF RECURRENT MAJOR DEPRESSIVE DISORDER: Primary | ICD-10-CM

## 2024-01-10 RX ORDER — HYDROXYZINE PAMOATE 25 MG/1
25 CAPSULE ORAL 3 TIMES DAILY PRN
Qty: 90 CAPSULE | Refills: 2 | Status: SHIPPED | OUTPATIENT
Start: 2024-01-10

## 2024-01-10 RX ORDER — FLUOXETINE HYDROCHLORIDE 20 MG/1
20 CAPSULE ORAL DAILY
Qty: 90 CAPSULE | Refills: 0 | Status: SHIPPED | OUTPATIENT
Start: 2024-01-10

## 2024-01-10 NOTE — PROGRESS NOTES
This provider is located at The [facility], Behavioral Health ,[address], [address] in [place] [address], using a secure [employer] Video Visit through [employer]. Patient is being seen remotely via telehealth at their home address in [place] [address], and stated they are in a secure environment for this session. The patient's condition being diagnosed/treated is appropriate for telemedicine. The provider identified herself as well as her credentials. The patient, and/or patients guardian, consent to be seen remotely, and when consent is given they understand that the consent allows for patient identifiable information to be sent to a third party as needed. They may refuse to be seen remotely at any time. The electronic data is encrypted and password protected, and the patient and/or guardian has been advised of the potential risks to privacy not withstanding such measures.     You have chosen to receive care through a telehealth visit.  Do you consent to use a video/audio connection for your medical care today? Yes    Subjective   Chayito Parra is a 43 y.o. female who presents today for follow up    Chief Complaint:  Anxiety and depression    History of Present Illness:   History of Present Illness  Chayito Parra presents today via MyChart video visit for medication management follow-up.  Verbalizes that everything has remained exactly the same as previous visit.  Symptoms have continued to be at baseline.  Voices intermittent feeling of something bad happening.  Does not feel that depression is an issue any longer, but does continue to struggle with anxiety.  Frequently feels anxious, on edge and worries.  Rates overall anxiety 7/10 on a 0-10 scale with 10 being the worst.  Struggles to fall asleep at times due to racing thoughts, but able to remain asleep when she does fall asleep.  Reports appetite is good.  Adamantly denies any SI/HI or AVH.  PHQ-9 total score: 5, APRIL-7 total score:  7.    Previous Psych Meds: Sertraline (times more than 15 years, stopped being effective), buspirone (increased anxiety), Lexapro (not effective), Effexor XR (weight gain).     The following portions of the patient's history were reviewed and updated as appropriate: allergies, current medications, past family history, past medical history, past social history, past surgical history and problem list.      Past Medical History:  Past Medical History:   Diagnosis Date    Anxiety     Depression     Hypertension     Migraine     Obsessive-compulsive disorder     Seizures        Social History:  Social History     Socioeconomic History    Marital status: Single   Tobacco Use    Smoking status: Never     Passive exposure: Never    Smokeless tobacco: Never   Vaping Use    Vaping Use: Never used   Substance and Sexual Activity    Alcohol use: No    Drug use: No    Sexual activity: Not Currently     Birth control/protection: Hysterectomy       Family History:  Family History   Problem Relation Age of Onset    Depression Mother        Past Surgical History:  Past Surgical History:   Procedure Laterality Date     SECTION      HYSTERECTOMY         Problem List:  There is no problem list on file for this patient.      Allergy:   Allergies   Allergen Reactions    Latex, Natural Rubber Itching        Current Medications:   Current Outpatient Medications   Medication Sig Dispense Refill    FLUoxetine (PROzac) 20 MG capsule Take 1 capsule by mouth Daily. 90 capsule 0    atenolol-chlorthalidone (TENORETIC) 50-25 MG per tablet Take 0.5 tablets by mouth Daily.      carBAMazepine (TEGretol) 200 MG tablet Take 1 tablet by mouth Every 12 (Twelve) Hours.      cyclobenzaprine (FLEXERIL) 10 MG tablet Take 1 tablet by mouth 3 (Three) Times a Day.      diazePAM (Valium) 5 MG tablet Take 0.5-1 tablets by mouth Daily As Needed for Anxiety. 15 tablet 0    hydrOXYzine pamoate (VISTARIL) 25 MG capsule Take 1 capsule by mouth 3 (Three) Times  a Day As Needed for Itching or Anxiety. 90 capsule 2    nortriptyline (PAMELOR) 50 MG capsule Take 1 capsule by mouth Every Night.       No current facility-administered medications for this visit.     Review of Systems   Constitutional:  Negative for activity change, appetite change, unexpected weight gain and unexpected weight loss.   Respiratory:  Negative for shortness of breath.    Cardiovascular:  Negative for chest pain.   Psychiatric/Behavioral:  Positive for dysphoric mood, sleep disturbance and stress. Negative for suicidal ideas. The patient is nervous/anxious.      Physical Exam  Constitutional:       General: She is not in acute distress.     Appearance: Normal appearance.   Neurological:      Mental Status: She is alert.     Vitals:   The patient was seen remotely today via a MyChart Video Visit through [facility].  Unable to obtain vital signs due to nature of remote visit.    Mental Status Exam:   Hygiene:    Appears good  Cooperation:  Cooperative  Eye Contact:   NATALIE  Psychomotor Behavior:  Appropriate  Affect:  Full range and Appropriate  Mood: sad and anxious  Hopelessness: Denies  Speech:  Normal  Thought Process:  Goal directed and Linear  Thought Content:  Mood congruent  Suicidal:  None  Homicidal:  None  Hallucinations:  None  Delusion:  None  Memory:  Intact  Orientation:  Person, Place, Time, and Situation  Reliability:  good  Insight:  Good  Judgement:  Good  Impulse Control:  Good    Lab Results:   No visits with results within 6 Month(s) from this visit.   Latest known visit with results is:   Admission on 11/10/2016, Discharged on 11/10/2016   Component Date Value Ref Range Status    Strep A Ag 11/10/2016 Negative  Negative Final    Influenza A Ag, EIA 11/10/2016 Negative  Negative Final    Influenza B Ag, EIA 11/10/2016 Negative  Negative Final    Throat Culture, Beta Strep 11/10/2016 No Beta Hemolytic Streptococcus Isolated   Final       EKG Results:  No orders to display       Assessment &  Plan   Problems Addressed this Visit    None  Visit Diagnoses       Mild episode of recurrent major depressive disorder    -  Primary    Relevant Medications    hydrOXYzine pamoate (VISTARIL) 25 MG capsule    FLUoxetine (PROzac) 20 MG capsule    Generalized anxiety disorder        Relevant Medications    hydrOXYzine pamoate (VISTARIL) 25 MG capsule    FLUoxetine (PROzac) 20 MG capsule          Diagnoses         Codes Comments    Mild episode of recurrent major depressive disorder    -  Primary ICD-10-CM: F33.0  ICD-9-CM: 296.31     Generalized anxiety disorder     ICD-10-CM: F41.1  ICD-9-CM: 300.02             Visit Diagnoses:    ICD-10-CM ICD-9-CM   1. Mild episode of recurrent major depressive disorder  F33.0 296.31   2. Generalized anxiety disorder  F41.1 300.02     Sidra presents today for medication management follow-up via [employer] video visit.  Feels that overall symptoms of anxiety and depression are well controlled with current medication regimen.  Says that symptoms have continued to remain at baseline.  Voices interest in continuing with medications as previously prescribed including Prozac 20 mg daily.  Will increase hydroxyzine from 10 to 20 mg 3 times daily to 25 mg 3 times daily as needed for anxiety/sleep.  Denies any adverse effects of current medication regimen.    -Refill Prozac 20 mg daily for anxiety and depression  -Refill diazepam 2.5 to 5 mg daily as needed for severe anxiety (#15 for 30-day supply)  -Continue hydroxyzine 10 - 20 mg 3 times daily as needed for anxiety/sleep    -Reviewed previous available documentation and most recent available labs. MARINO reviewed per PDMP and is appropriate.     GOALS:  Short Term Goals: Patient will be compliant with medication, and patient will have no significant medication related side effects.  Patient will be engaged in psychotherapy as indicated.  Patient will report subjective improvement of symptoms.  Long term goals: To stabilize mood and  treat/improve subjective symptoms, the patient will stay out of the hospital, the patient will be at an optimal level of functioning, and the patient will take all medications as prescribed.  The patient/guardian verbalized understanding and agreement with goals that were mutually set.    TREATMENT PLAN: Continue supportive psychotherapy efforts and medications as indicated for patient's diagnosis.  Pharmacological and Non-Pharmacological treatment options discussed during today's visit. Patient/Guardian acknowledged and verbally consented with current treatment plan and was educated on the importance of compliance with treatment and follow-up appointments.      MEDICATION ISSUES:  Discussed medication options and treatment plan of prescribed medication as well as the risks, benefits, any black box warnings, and side effects including potential falls, possible impaired driving, and metabolic adversities among others. Patient is agreeable to call the office with any worsening of symptoms or onset of side effects, or if any concerns or questions arise.  The contact information for the office is made available to the patient. Patient is agreeable to call 911 or go to the nearest ER should they begin having any SI/HI, or if any urgent concerns arise. No medication side effects or related complaints today.     We have discussed potential risks and side effects of benzodiazepine use including addiction with continued use, sedation, fatigue, depression, dizziness, ataxia, slurred speech, weakness, forgetfulness, confusion, hyperexcitability, nervousness, hallucinations, rachel or hypomania, hypotension, hypersalivation, dry mouth, respiratory depression especially when taken with CNS depressants, increased risk of falls, and impaired driving. The patient is advised to not drive when taking a controlled substance.  The patient is also informed that the medication is to be used by the patient only, it is to be taken only as  prescribed/directed, and to avoid any combined use of alcohol/ETOH or other substances unless prescribed and as directed by a Provider.  The patient verbalizes understanding and willingness to accept the potential side effects and risks for a better quality of life, and is currently in compliance and agreement with the plan of care.  The patient is in agreement with the terms of the controlled substance agreement.  The patient is advised that MARINO reports will be reviewed periodically, as well as random drug screens will be performed periodically, and as needed.    MEDS ORDERED DURING VISIT:  New Medications Ordered This Visit   Medications    hydrOXYzine pamoate (VISTARIL) 25 MG capsule     Sig: Take 1 capsule by mouth 3 (Three) Times a Day As Needed for Itching or Anxiety.     Dispense:  90 capsule     Refill:  2    FLUoxetine (PROzac) 20 MG capsule     Sig: Take 1 capsule by mouth Daily.     Dispense:  90 capsule     Refill:  0       FOLLOW UP:  Return in about 3 months (around 4/10/2024) for Recheck, Video visit.             This document has been electronically signed by LINDSAY Gillette  January 24, 2024 23:00 EST    Please note that portions of this note were completed with a voice recognition program. Efforts were made to edit dictation, but occasionally words are mistranscribed.

## 2024-04-03 DIAGNOSIS — F41.1 GENERALIZED ANXIETY DISORDER: ICD-10-CM

## 2024-04-03 RX ORDER — HYDROXYZINE PAMOATE 25 MG/1
25 CAPSULE ORAL 3 TIMES DAILY PRN
Qty: 90 CAPSULE | Refills: 2 | Status: SHIPPED | OUTPATIENT
Start: 2024-04-03

## 2024-04-03 NOTE — TELEPHONE ENCOUNTER
Rx Refill Note  Requested Prescriptions     Pending Prescriptions Disp Refills    hydrOXYzine pamoate (VISTARIL) 25 MG capsule 90 capsule 2     Sig: Take 1 capsule by mouth 3 (Three) Times a Day As Needed for Itching or Anxiety.      Last office visit with prescribing clinician: 4/3/2023   Last telemedicine visit with prescribing clinician: 1/10/2024   Next office visit with prescribing clinician: 4/16/2024                         Would you like a call back once the refill request has been completed: [] Yes [] No    If the office needs to give you a call back, can they leave a voicemail: [] Yes [] No    Daisy Rubio CMA  04/03/24, 12:11 EDT

## 2024-04-16 ENCOUNTER — TELEMEDICINE (OUTPATIENT)
Dept: PSYCHIATRY | Facility: CLINIC | Age: 44
End: 2024-04-16
Payer: COMMERCIAL

## 2024-04-16 DIAGNOSIS — F41.1 GENERALIZED ANXIETY DISORDER: Primary | ICD-10-CM

## 2024-04-16 DIAGNOSIS — F33.1 MODERATE EPISODE OF RECURRENT MAJOR DEPRESSIVE DISORDER: ICD-10-CM

## 2024-04-16 PROCEDURE — 99214 OFFICE O/P EST MOD 30 MIN: CPT | Performed by: NURSE PRACTITIONER

## 2024-04-16 RX ORDER — FLUOXETINE HYDROCHLORIDE 40 MG/1
40 CAPSULE ORAL DAILY
Qty: 30 CAPSULE | Refills: 2 | Status: SHIPPED | OUTPATIENT
Start: 2024-04-16

## 2024-04-16 RX ORDER — HYDROXYZINE PAMOATE 25 MG/1
25 CAPSULE ORAL 3 TIMES DAILY PRN
Qty: 90 CAPSULE | Refills: 2 | Status: SHIPPED | OUTPATIENT
Start: 2024-04-16

## 2024-04-16 NOTE — PROGRESS NOTES
This provider is located at [facility], [facility]al [facility] ,[address], [address] in [place] [address], using a secure [employer] Video Visit through [employer]. Patient is being seen remotely via telehealth at their home address in [place] [address], and stated they are in a secure environment for this session. The patient's condition being diagnosed/treated is appropriate for telemedicine. The provider identified herself as well as her credentials. The patient, and/or patients guardian, consent to be seen remotely, and when consent is given they understand that the consent allows for patient identifiable information to be sent to a third party as needed. They may refuse to be seen remotely at any time. The electronic data is encrypted and password protected, and the patient and/or guardian has been advised of the potential risks to privacy not withstanding such measures.     You have chosen to receive care through a telehealth visit.  Do you consent to use a video/audio connection for your medical care today? Yes    Subjective   Chayito Parra is a 43 y.o. female who presents today for follow up    Chief Complaint:  Anxiety and depression    History of Present Illness:   History of Present Illness  Chayito Parra presents today for medication management follow-up via MyChart video visit.  Currently taking Prozac 20 mg daily and hydroxyzine 25 mg 3 times daily as needed.  Voices increased anxiety recently as well as symptoms of depression. Has been dealing with increased stressors surrounding relationship with daughter. Experiencing several days of feeling down, decreased interest in activities and decreased motivation. Also reports several days of feeling nervous, anxious, on edge, worry and has trouble relaxing. Rates overall depression 5/10, rates anxiety 9/10 on a 0-10 scale with 10 being the worst. Has been sleeping well overall.  Denies SI/HI.  PHQ-9 total Score: 5, APRIL-7 total Score:  7.    Previous Psych Meds: Sertraline (times more than 15 years, stopped being effective), buspirone (increased anxiety), Lexapro (not effective), Effexor XR (weight gain).     The following portions of the patient's history were reviewed and updated as appropriate: allergies, current medications, past family history, past medical history, past social history, past surgical history and problem list.      Past Medical History:  Past Medical History:   Diagnosis Date    Anxiety     Depression     Hypertension     Migraine     Obsessive-compulsive disorder     Seizures        Social History:  Social History     Socioeconomic History    Marital status: Single   Tobacco Use    Smoking status: Never     Passive exposure: Never    Smokeless tobacco: Never   Vaping Use    Vaping status: Never Used   Substance and Sexual Activity    Alcohol use: No    Drug use: No    Sexual activity: Not Currently     Birth control/protection: Hysterectomy       Family History:  Family History   Problem Relation Age of Onset    Depression Mother        Past Surgical History:  Past Surgical History:   Procedure Laterality Date     SECTION      HYSTERECTOMY         Problem List:  There is no problem list on file for this patient.      Allergy:   Allergies   Allergen Reactions    Latex, Natural Rubber Itching        Current Medications:   Current Outpatient Medications   Medication Sig Dispense Refill    FLUoxetine (PROzac) 40 MG capsule Take 1 capsule by mouth Daily. 30 capsule 2    hydrOXYzine pamoate (VISTARIL) 25 MG capsule Take 1 capsule by mouth 3 (Three) Times a Day As Needed for Itching or Anxiety. 90 capsule 2    atenolol-chlorthalidone (TENORETIC) 50-25 MG per tablet Take 0.5 tablets by mouth Daily.      carBAMazepine (TEGretol) 200 MG tablet Take 1 tablet by mouth Every 12 (Twelve) Hours.      cyclobenzaprine (FLEXERIL) 10 MG tablet Take 1 tablet by mouth 3 (Three) Times a Day.      diazePAM (Valium) 5 MG tablet Take 0.5-1  tablets by mouth Daily As Needed for Anxiety. 15 tablet 0    nortriptyline (PAMELOR) 50 MG capsule Take 1 capsule by mouth Every Night.       No current facility-administered medications for this visit.     Review of Systems   Constitutional:  Negative for activity change, appetite change, unexpected weight gain and unexpected weight loss.   Respiratory:  Negative for shortness of breath.    Cardiovascular:  Negative for chest pain.   Psychiatric/Behavioral:  Positive for dysphoric mood and stress. Negative for sleep disturbance and suicidal ideas. The patient is nervous/anxious.      Physical Exam  Constitutional:       General: She is not in acute distress.     Appearance: Normal appearance.   Neurological:      Mental Status: She is alert.     Vitals:   The patient was seen remotely today via a MyChart Video Visit through [facility].  Unable to obtain vital signs due to nature of remote visit.    Mental Status Exam:   Hygiene:    Appears good  Cooperation:  Cooperative  Eye Contact:   NATALIE  Psychomotor Behavior:  Appropriate  Affect:  Full range and Appropriate  Mood: sad and anxious  Hopelessness: Denies  Speech:  Normal  Thought Process:  Goal directed and Linear  Thought Content:  Mood congruent  Suicidal:  None  Homicidal:  None  Hallucinations:  None  Delusion:  None  Memory:  Intact  Orientation:  Person, Place, Time, and Situation  Reliability:  good  Insight:  Good  Judgement:  Good  Impulse Control:  Good    Lab Results:   No visits with results within 6 Month(s) from this visit.   Latest known visit with results is:   Admission on 11/10/2016, Discharged on 11/10/2016   Component Date Value Ref Range Status    Strep A Ag 11/10/2016 Negative  Negative Final    Influenza A Ag, EIA 11/10/2016 Negative  Negative Final    Influenza B Ag, EIA 11/10/2016 Negative  Negative Final    Throat Culture, Beta Strep 11/10/2016 No Beta Hemolytic Streptococcus Isolated   Final       EKG Results:  No orders to display        Assessment & Plan   Problems Addressed this Visit    None  Visit Diagnoses       Generalized anxiety disorder    -  Primary    Relevant Medications    FLUoxetine (PROzac) 40 MG capsule    hydrOXYzine pamoate (VISTARIL) 25 MG capsule    Moderate episode of recurrent major depressive disorder        Relevant Medications    FLUoxetine (PROzac) 40 MG capsule    hydrOXYzine pamoate (VISTARIL) 25 MG capsule          Diagnoses         Codes Comments    Generalized anxiety disorder    -  Primary ICD-10-CM: F41.1  ICD-9-CM: 300.02     Moderate episode of recurrent major depressive disorder     ICD-10-CM: F33.1  ICD-9-CM: 296.32             Visit Diagnoses:    ICD-10-CM ICD-9-CM   1. Generalized anxiety disorder  F41.1 300.02   2. Moderate episode of recurrent major depressive disorder  F33.1 296.32     [facility] management follow-up.  Having increased anxiety and symptoms of depression, likely due to situational stressors. Feels that medication regimen has been beneficial in decreasing severity of symptoms. Agreeable to increase Prozac from 20 mg to 40 mg daily and continue hydroxyzine 25 mg 3 times daily as needed. Denies any adverse effects of current medication regimen.    -Increase Prozac from 20 mg to 40 mg daily for anxiety and depression  -Refill hydroxyzine 25 mg 3 times daily as needed for anxiety  -Continue diazepam 2.5 to 5 mg daily as needed for severe anxiety (#15 for 30-day supply, last Rx obtained 10/31/2023 per [facility])    -Reviewed previous available documentation and most recent available labs. MARINO reviewed per PDMP and is appropriate.     GOALS:  Short Term Goals: Patient will be compliant with medication, and patient will have no significant medication related side effects.  Patient will be engaged in psychotherapy as indicated.  Patient will report subjective improvement of symptoms.  Long term goals: To stabilize mood and treat/improve  subjective symptoms, the patient will stay out of the hospital, the patient will be at an optimal level of functioning, and the patient will take all medications as prescribed.  The patient/guardian verbalized understanding and agreement with goals that were mutually set.    TREATMENT PLAN: Continue supportive psychotherapy efforts and medications as indicated for patient's diagnosis.  Pharmacological and Non-Pharmacological treatment options discussed during today's visit. Patient/Guardian acknowledged and verbally consented with current treatment plan and was educated on the importance of compliance with treatment and follow-up appointments.      MEDICATION ISSUES:  Discussed medication options and treatment plan of prescribed medication as well as the risks, benefits, any black box warnings, and side effects including potential falls, possible impaired driving, and metabolic adversities among others. Patient is agreeable to call the office with any worsening of symptoms or onset of side effects, or if any concerns or questions arise.  The contact information for the office is made available to the patient. Patient is agreeable to call 911 or go to the nearest ER should they begin having any SI/HI, or if any urgent concerns arise. No medication side effects or related complaints today.     We have discussed potential risks and side effects of benzodiazepine use including addiction with continued use, sedation, fatigue, depression, dizziness, ataxia, slurred speech, weakness, forgetfulness, confusion, hyperexcitability, nervousness, hallucinations, rachel or hypomania, hypotension, hypersalivation, dry mouth, respiratory depression especially when taken with CNS depressants, increased risk of falls, and impaired driving. The patient is advised to not drive when taking a controlled substance.  The patient is also informed that the medication is to be used by the patient only, it is to be taken only as  prescribed/directed, and to avoid any combined use of alcohol/ETOH or other substances unless prescribed and as directed by a Provider.  The patient verbalizes understanding and willingness to accept the potential side effects and risks for a better quality of life, and is currently in compliance and agreement with the plan of care.  The patient is in agreement with the terms of the controlled substance agreement.  The patient is advised that MARINO reports will be reviewed periodically, as well as random drug screens will be performed periodically, and as needed.    MEDS ORDERED DURING VISIT:  New Medications Ordered This Visit   Medications    FLUoxetine (PROzac) 40 MG capsule     Sig: Take 1 capsule by mouth Daily.     Dispense:  30 capsule     Refill:  2    hydrOXYzine pamoate (VISTARIL) 25 MG capsule     Sig: Take 1 capsule by mouth 3 (Three) Times a Day As Needed for Itching or Anxiety.     Dispense:  90 capsule     Refill:  2       FOLLOW UP:  Return in about 8 weeks (around 6/11/2024) for Video visit, Recheck.             This document has been electronically signed by LINDSAY Gillette  April 16, 2024 17:54 EDT    Please note that portions of this note were completed with a voice recognition program. Efforts were made to edit dictation, but occasionally words are mistranscribed.

## 2024-04-17 ENCOUNTER — TELEPHONE (OUTPATIENT)
Dept: PSYCHIATRY | Facility: CLINIC | Age: 44
End: 2024-04-17
Payer: COMMERCIAL

## 2024-04-17 NOTE — TELEPHONE ENCOUNTER
[facility] pharmacist called in states that Patient Fluoxetine was increased by Zina and Patient is also on Nortriptyline by another provider she said this can increase risk of serotonin please advise if you still want pharmacy to fill the medication.

## 2024-04-24 DIAGNOSIS — F41.1 GENERALIZED ANXIETY DISORDER: ICD-10-CM

## 2024-04-24 RX ORDER — FLUOXETINE HYDROCHLORIDE 40 MG/1
40 CAPSULE ORAL DAILY
Qty: 30 CAPSULE | Refills: 2 | Status: SHIPPED | OUTPATIENT
Start: 2024-04-24

## 2024-07-18 DIAGNOSIS — F41.1 GENERALIZED ANXIETY DISORDER: ICD-10-CM

## 2024-07-18 RX ORDER — FLUOXETINE HYDROCHLORIDE 40 MG/1
40 CAPSULE ORAL DAILY
Qty: 30 CAPSULE | Refills: 2 | Status: SHIPPED | OUTPATIENT
Start: 2024-07-18

## 2024-09-24 DIAGNOSIS — F41.1 GENERALIZED ANXIETY DISORDER: ICD-10-CM

## 2024-09-24 RX ORDER — HYDROXYZINE PAMOATE 25 MG/1
25 CAPSULE ORAL 3 TIMES DAILY PRN
Qty: 90 CAPSULE | Refills: 2 | Status: SHIPPED | OUTPATIENT
Start: 2024-09-24

## 2024-10-18 DIAGNOSIS — F41.1 GENERALIZED ANXIETY DISORDER: ICD-10-CM

## 2024-10-18 RX ORDER — FLUOXETINE 40 MG/1
40 CAPSULE ORAL DAILY
Qty: 30 CAPSULE | Refills: 0 | Status: SHIPPED | OUTPATIENT
Start: 2024-10-18

## 2024-10-23 ENCOUNTER — TELEMEDICINE (OUTPATIENT)
Dept: PSYCHIATRY | Facility: CLINIC | Age: 44
End: 2024-10-23
Payer: COMMERCIAL

## 2024-10-23 DIAGNOSIS — F41.1 GENERALIZED ANXIETY DISORDER: Primary | ICD-10-CM

## 2024-10-23 DIAGNOSIS — G47.09 OTHER INSOMNIA: ICD-10-CM

## 2024-10-23 DIAGNOSIS — F33.0 MILD EPISODE OF RECURRENT MAJOR DEPRESSIVE DISORDER: ICD-10-CM

## 2024-10-23 RX ORDER — HYDROXYZINE PAMOATE 25 MG/1
25-50 CAPSULE ORAL 3 TIMES DAILY PRN
Qty: 180 CAPSULE | Refills: 2 | Status: SHIPPED | OUTPATIENT
Start: 2024-10-23

## 2024-10-23 RX ORDER — PAROXETINE 10 MG/1
10 TABLET, FILM COATED ORAL DAILY
Qty: 30 TABLET | Refills: 2 | Status: SHIPPED | OUTPATIENT
Start: 2024-10-23

## 2024-10-23 NOTE — PROGRESS NOTES
"This provider is located at [facility], Behavioral [facility], [address], [address] in [place] [address], using a secure [employer] Video Visit through [employer]. Patient is being seen remotely via telehealth at their home address in [place] [address], and stated they are in a secure environment for this session. The patient's condition being diagnosed/treated is appropriate for telemedicine. The provider identified herself as well as her credentials. The patient, and/or patients guardian, consent to be seen remotely, and when consent is given they understand that the consent allows for patient identifiable information to be sent to a third party as needed. They may refuse to be seen remotely at any time. The electronic data is encrypted and password protected, and the patient and/or guardian has been advised of the potential risks to privacy not withstanding such measures.     You have chosen to receive care through a telehealth visit.  Do you consent to use a video/audio connection for your medical care today? Yes    Subjective   Chayito Parra is a 43 y.o. female who presents today for follow up    Chief Complaint:  Anxiety and depression    History of Present Illness:   History of Present Illness  Chayito Parra presents via Mychart video visit for medicaiton management follow-up. Last follow up visit was 4/16/24. Anxiety level has been elevated with constant worry, anxiousness and always fearful of something bad happening. Has been dealing with situational stressors that include illness of her father, issues with her daughter and being laid off from job. Rates anxiety 7/10 on a 0-10 scale with 10 being the worst. Voices that depression is \"not so bad.\" Struggles to fall asleep due to racing thoughts and worry. Able to maintain sleep, not able to quantify number of hours she sleeps each night. Reports compliance with daily medication, currently taking Prozac 40 mg daily and hydroxyzine 25 " mg 3 times daily as needed. Denies SI/HI.  PHQ-9 total Score: 7, APRIL-7 total Score: 9.    Previous Psych Meds: Sertraline (times more than 15 years, stopped being effective), buspirone (increased anxiety), Lexapro (not effective), Effexor XR (weight gain).    The following portions of the patient's history were reviewed and updated as appropriate: allergies, current medications, past family history, past medical history, past social history, past surgical history and problem list.    Past Medical History:   Diagnosis Date    Anxiety     Depression     Hypertension     Migraine     Obsessive-compulsive disorder     Seizures      Social History     Socioeconomic History    Marital status: Single   Tobacco Use    Smoking status: Never     Passive exposure: Never    Smokeless tobacco: Never   Vaping Use    Vaping status: Never Used   Substance and Sexual Activity    Alcohol use: No    Drug use: No    Sexual activity: Not Currently     Birth control/protection: Hysterectomy     Family History   Problem Relation Age of Onset    Depression Mother      Past Surgical History:   Procedure Laterality Date     SECTION      HYSTERECTOMY       Problem List:  There is no problem list on file for this patient.    Allergies   Allergen Reactions    Latex, Natural Rubber Itching      Current Outpatient Medications   Medication Sig Dispense Refill    FLUoxetine (PROzac) 20 MG capsule Take 1 capsule by mouth Daily for 7 days, THEN 1 capsule Every Other Day for 6 days. 10 capsule 0    hydrOXYzine pamoate (VISTARIL) 25 MG capsule Take 1-2 capsules by mouth 3 (Three) Times a Day As Needed for Anxiety. 180 capsule 2    atenolol-chlorthalidone (TENORETIC) 50-25 MG per tablet Take 0.5 tablets by mouth Daily.      carBAMazepine (TEGretol) 200 MG tablet Take 1 tablet by mouth Every 12 (Twelve) Hours.      cyclobenzaprine (FLEXERIL) 10 MG tablet Take 1 tablet by mouth 3 (Three) Times a Day.      diazePAM (Valium) 5 MG tablet Take 0.5-1  tablets by mouth Daily As Needed for Anxiety. 15 tablet 0    nortriptyline (PAMELOR) 50 MG capsule Take 1 capsule by mouth Every Night.      PARoxetine (Paxil) 10 MG tablet Take 1 tablet by mouth Daily. 30 tablet 2     No current facility-administered medications for this visit.     Review of Systems   Constitutional:  Negative for activity change, appetite change, unexpected weight gain and unexpected weight loss.   Respiratory:  Negative for shortness of breath.    Cardiovascular:  Negative for chest pain.   Psychiatric/Behavioral:  Positive for dysphoric mood and stress. Negative for sleep disturbance and suicidal ideas. The patient is nervous/anxious.      Physical Exam  Constitutional:       General: She is not in acute distress.     Appearance: Normal appearance.   Neurological:      Mental Status: She is alert.     Vitals:   The patient was seen remotely today via a [employer] Video Visit through [employer].  Unable to obtain vital signs due to nature of remote visit.    Mental Status Exam:   Hygiene:    Appears good  Cooperation:  Cooperative  Eye Contact:   NATALIE  Psychomotor Behavior:  Appropriate  Affect:  Full range and Appropriate  Mood: euthymic  Hopelessness: Denies  Speech:  Normal  Thought Process:  Goal directed and Linear  Thought Content:  Mood congruent  Suicidal:  None  Homicidal:  None  Hallucinations:  None  Delusion:  None  Memory:  Intact  Orientation:  Person, Place, Time, and Situation  Reliability:  good  Insight:  Good  Judgement:  Good  Impulse Control:  Good    Assessment & Plan   Problems Addressed this Visit    None  Visit Diagnoses       Generalized anxiety disorder    -  Primary    Relevant Medications    PARoxetine (Paxil) 10 MG tablet    FLUoxetine (PROzac) 20 MG capsule    hydrOXYzine pamoate (VISTARIL) 25 MG capsule    Mild episode of recurrent major depressive disorder        Relevant Medications    PARoxetine (Paxil) 10 MG tablet    FLUoxetine (PROzac) 20 MG capsule    hydrOXYzine  pamoate (VISTARIL) 25 MG capsule    Other insomnia              Diagnoses         Codes Comments    Generalized anxiety disorder    -  Primary ICD-10-CM: F41.1  ICD-9-CM: 300.02     Mild episode of recurrent major depressive disorder     ICD-10-CM: F33.0  ICD-9-CM: 296.31     Other insomnia     ICD-10-CM: G47.09  ICD-9-CM: 780.52             Visit Diagnoses:    ICD-10-CM ICD-9-CM   1. Generalized anxiety disorder  F41.1 300.02   2. Mild episode of recurrent major depressive disorder  F33.0 296.31   3. Other insomnia  G47.09 780.52     Chayito presents via [employer] video visit for medication management. Reports increase in anxiety. Has also been dealing with situational stressors that has likely exacerbated anxiety. Depression has been minimal. Does not feel that current regimen has been effective and voices interest in changing medication for better control of symptoms. Discussed tapering/discontinuing Prozac and starting Paxil 10 mg daily. Will continue with Hydroxyzine Pamoate, increasing from 25 mg to 25-50 mg three times daily as needed.     -Taper/Discontinue Prozac, decrease from 40 mg to 20 mg daily x 7 days then 20 mg every other day x 7 days then stop  -Start Paxil 10 mg daily  -Refill hydroxyzine 25-50 mg 3 times daily as needed    -Reviewed previous available documentation and most recent available labs. MARINO reviewed per PDMP and is appropriate.     GOALS:  Short Term Goals: Patient will be compliant with medication, and patient will have no significant medication related side effects.  Patient will be engaged in psychotherapy as indicated.  Patient will report subjective improvement of symptoms.  Long term goals: To stabilize mood and treat/improve subjective symptoms, the patient will stay out of the hospital, the patient will be at an optimal level of functioning, and the patient will take all medications as prescribed.  The patient/guardian verbalized understanding and agreement with goals that were  mutually set.    TREATMENT PLAN: Continue supportive psychotherapy efforts and medications as indicated for patient's diagnosis.  Pharmacological and Non-Pharmacological treatment options discussed during today's visit. Patient/Guardian acknowledged and verbally consented with current treatment plan and was educated on the importance of compliance with treatment and follow-up appointments.      MEDICATION ISSUES:  Discussed medication options and treatment plan of prescribed medication as well as the risks, benefits, any black box warnings, and side effects including potential falls, possible impaired driving, and metabolic adversities among others. Patient is agreeable to call the office with any worsening of symptoms or onset of side effects, or if any concerns or questions arise.  The contact information for the office is made available to the patient. Patient is agreeable to call 911 or go to the nearest ER should they begin having any SI/HI, or if any urgent concerns arise. No medication side effects or related complaints today.     MEDS ORDERED DURING VISIT:  New Medications Ordered This Visit   Medications    PARoxetine (Paxil) 10 MG tablet     Sig: Take 1 tablet by mouth Daily.     Dispense:  30 tablet     Refill:  2    FLUoxetine (PROzac) 20 MG capsule     Sig: Take 1 capsule by mouth Daily for 7 days, THEN 1 capsule Every Other Day for 6 days.     Dispense:  10 capsule     Refill:  0    hydrOXYzine pamoate (VISTARIL) 25 MG capsule     Sig: Take 1-2 capsules by mouth 3 (Three) Times a Day As Needed for Anxiety.     Dispense:  180 capsule     Refill:  2       FOLLOW UP:  Return in about 8 weeks (around 12/18/2024) for Recheck, Video visit.             This document has been electronically signed by LINDSAY Gillette  October 23, 2024 13:02 EDT    Please note that portions of this note were completed with a voice recognition program. Efforts were made to edit dictation, but occasionally words are  mistranscribed.

## 2024-12-18 ENCOUNTER — TELEMEDICINE (OUTPATIENT)
Dept: PSYCHIATRY | Facility: CLINIC | Age: 44
End: 2024-12-18
Payer: COMMERCIAL

## 2024-12-18 DIAGNOSIS — F41.1 GENERALIZED ANXIETY DISORDER: Primary | ICD-10-CM

## 2024-12-18 DIAGNOSIS — F33.1 MODERATE EPISODE OF RECURRENT MAJOR DEPRESSIVE DISORDER: ICD-10-CM

## 2024-12-18 RX ORDER — DESVENLAFAXINE 25 MG/1
25 TABLET, EXTENDED RELEASE ORAL DAILY
Qty: 30 TABLET | Refills: 1 | Status: SHIPPED | OUTPATIENT
Start: 2024-12-18

## 2024-12-18 RX ORDER — LORAZEPAM 0.5 MG/1
0.5 TABLET ORAL DAILY PRN
Qty: 15 TABLET | Refills: 0 | Status: SHIPPED | OUTPATIENT
Start: 2024-12-18

## 2024-12-18 NOTE — PROGRESS NOTES
This provider is located at [facility], [facility], [address], [address] in [place] [address], using a secure [employer] Video Visit through [employer]. Patient is being seen remotely via telehealth at their home address in [place] [address], and stated they are in a secure environment for this session. The patient's condition being diagnosed/treated is appropriate for telemedicine. The provider identified herself as well as her credentials. The patient, and/or patients guardian, consent to be seen remotely, and when consent is given they understand that the consent allows for patient identifiable information to be sent to a third party as needed. They may refuse to be seen remotely at any time. The electronic data is encrypted and password protected, and the patient and/or guardian has been advised of the potential risks to privacy not withstanding such measures.     You have chosen to receive care through a telehealth visit.  Do you consent to use a video/audio connection for your medical care today? Yes    Mode of Visit: Video   Location of patient: -HOME-   Location of provider: +[facility]+   You have chosen to receive care through a telehealth visit.   The patient has signed the video visit consent form.   The visit included audio and video interaction. No technical issues occurred during this visit.     Subjective   Chayito Parra is a 44 y.o. female who presents today for follow up    Chief Complaint:  Anxiety and depression    History of Present Illness:   History of Present Illness  Chayito Parra presents via MyChart video visit for medication management follow up. Her last visit was 10/23/24. Says that she has noticed no improvement in depression or anxiety. Says that she is able to do things that need to be done, but then struggles with motivation, has low energy and no interest in doing things. Mood is down and depressed on most days with symptoms that have seemed to  worsen over past two weeks. Says that she has still not put up her [place] tree. Also feeling anxious, nervous and worries often. Rates depression 6/10 and rates anxiety 6/10 on a 0-10 scale with 10 being the worst. Sleep has been poor, not feeling that she obtains good quality sleep. Having vivid dreams about her mother and sister in law. Feel that medication has caused the vivid dreams. Currently taking Paxil 10 mg daily and Hydroxyzine 25-50 mg three times daily as needed. Denies SI/HI.  PHQ-9 total Score: 9, APRIL-7 total Score: 9.    Previous Psych Meds: Sertraline (times more than 15 years, stopped being effective), buspirone (increased anxiety), Lexapro (not effective), Prozac (not effective), Effexor XR (weight gain), Clonazepam, Diazepam, Paxil (vivid dreams)    The following portions of the patient's history were reviewed and updated as appropriate: allergies, current medications, past family history, past medical history, past social history, past surgical history and problem list.    Past Medical History:   Diagnosis Date    Anxiety     Depression     Hypertension     Migraine     Obsessive-compulsive disorder     Seizures      Social History     Socioeconomic History    Marital status: Single   Tobacco Use    Smoking status: Never     Passive exposure: Never    Smokeless tobacco: Never   Vaping Use    Vaping status: Never Used   Substance and Sexual Activity    Alcohol use: No    Drug use: No    Sexual activity: Not Currently     Birth control/protection: Hysterectomy     Family History   Problem Relation Age of Onset    Depression Mother      Past Surgical History:   Procedure Laterality Date     SECTION      HYSTERECTOMY       Problem List:  There is no problem list on file for this patient.    Allergies   Allergen Reactions    Latex, Natural Rubber Itching      Current Outpatient Medications   Medication Sig Dispense Refill    atenolol-chlorthalidone (TENORETIC) 50-25 MG per tablet Take 0.5  tablets by mouth Daily.      carBAMazepine (TEGretol) 200 MG tablet Take 1 tablet by mouth Every 12 (Twelve) Hours.      cyclobenzaprine (FLEXERIL) 10 MG tablet Take 1 tablet by mouth 3 (Three) Times a Day.      Desvenlafaxine Succinate ER (Pristiq) 25 MG tablet sustained-release 24 hour Take 1 tablet by mouth Daily. 30 tablet 1    hydrOXYzine pamoate (VISTARIL) 25 MG capsule Take 1-2 capsules by mouth 3 (Three) Times a Day As Needed for Anxiety. 180 capsule 2    LORazepam (Ativan) 0.5 MG tablet Take 1 tablet by mouth Daily As Needed for Anxiety. 15 tablet 0    nortriptyline (PAMELOR) 50 MG capsule Take 1 capsule by mouth Every Night.       No current facility-administered medications for this visit.     Review of Systems   Constitutional:  Negative for activity change, appetite change, unexpected weight gain and unexpected weight loss.   Respiratory:  Negative for shortness of breath.    Cardiovascular:  Negative for chest pain.   Psychiatric/Behavioral:  Positive for dysphoric mood and stress. Negative for sleep disturbance and suicidal ideas. The patient is nervous/anxious.      Physical Exam  Constitutional:       General: She is not in acute distress.     Appearance: Normal appearance.   Neurological:      Mental Status: She is alert.     Vitals:   The patient was seen remotely today via a MyChart Video Visit through [facility]. Unable to obtain vital signs due to nature of remote visit.    Mental Status Exam:   Hygiene:    Appears good  Cooperation:  Cooperative  Eye Contact:   NATALIE  Psychomotor Behavior:  Appropriate  Affect:  Full range and Appropriate  Mood: euthymic  Hopelessness: Denies  Speech:  Normal  Thought Process:  Goal directed and Linear  Thought Content:  Mood congruent  Suicidal:  None  Homicidal:  None  Hallucinations:  None  Delusion:  None  Memory:  Intact  Orientation:  Person, Place, Time, and Situation  Reliability:  good  Insight:  Good  Judgement:  Good  Impulse Control:  Good    Assessment &  Plan   Problems Addressed this Visit    None  Visit Diagnoses       Generalized anxiety disorder    -  Primary    Relevant Medications    Desvenlafaxine Succinate ER (Pristiq) 25 MG tablet sustained-release 24 hour    LORazepam (Ativan) 0.5 MG tablet    Moderate episode of recurrent major depressive disorder        Relevant Medications    Desvenlafaxine Succinate ER (Pristiq) 25 MG tablet sustained-release 24 hour    LORazepam (Ativan) 0.5 MG tablet          Diagnoses         Codes Comments    Generalized anxiety disorder    -  Primary ICD-10-CM: F41.1  ICD-9-CM: 300.02     Moderate episode of recurrent major depressive disorder     ICD-10-CM: F33.1  ICD-9-CM: 296.32             Visit Diagnoses:    ICD-10-CM ICD-9-CM   1. Generalized anxiety disorder  F41.1 300.02   2. Moderate episode of recurrent major depressive disorder  F33.1 296.32     [facility] medication management follow up. She has continued to struggle with symptoms of depression and anxiety that have been remained persistent and feels worse at times. Sleep has been poor and having more vivid dreams that she feels is related to medication. Discussed medication regimen and other options. She is agreeable to stop Paxil 10 mg daily and start Pristiq 25 mg daily and continue with Hydroxyzine Pamoate 25-50 mg three times daily as needed. Will provide short supply of Lorazepam 0.5 mg daily as needed for severe anxiety/panic while transitioning medications.     -Stop Paxil 10 mg daily  -Start Pristiq 25 mg daily  -Start Lorazepam 0.5 mg daily as needed (#15 provided for 30 days supply to use only in severe anxiety/panic)  -Continue hydroxyzine 25-50 mg 3 times daily as needed    -Reviewed previous available documentation and most recent available labs. MARINO reviewed per PDMP and is appropriate.     GOALS:  Short Term Goals: Patient will be compliant with medication, and patient will have no significant medication related side  effects.  Patient will be engaged in psychotherapy as indicated.  Patient will report subjective improvement of symptoms.  Long term goals: To stabilize mood and treat/improve subjective symptoms, the patient will stay out of the hospital, the patient will be at an optimal level of functioning, and the patient will take all medications as prescribed.  The patient/guardian verbalized understanding and agreement with goals that were mutually set.    TREATMENT PLAN: Continue supportive psychotherapy efforts and medications as indicated for patient's diagnosis.  Pharmacological and Non-Pharmacological treatment options discussed during today's visit. Patient/Guardian acknowledged and verbally consented with current treatment plan and was educated on the importance of compliance with treatment and follow-up appointments.      MEDICATION ISSUES:  Discussed medication options and treatment plan of prescribed medication as well as the risks, benefits, any black box warnings, and side effects including potential falls, possible impaired driving, and metabolic adversities among others. Patient is agreeable to call the office with any worsening of symptoms or onset of side effects, or if any concerns or questions arise.  The contact information for the office is made available to the patient. Patient is agreeable to call 911 or go to the nearest ER should they begin having any SI/HI, or if any urgent concerns arise. No medication side effects or related complaints today.     MEDS ORDERED DURING VISIT:  New Medications Ordered This Visit   Medications    Desvenlafaxine Succinate ER (Pristiq) 25 MG tablet sustained-release 24 hour     Sig: Take 1 tablet by mouth Daily.     Dispense:  30 tablet     Refill:  1    LORazepam (Ativan) 0.5 MG tablet     Sig: Take 1 tablet by mouth Daily As Needed for Anxiety.     Dispense:  15 tablet     Refill:  0       FOLLOW UP:  Return in about 6 weeks (around 1/29/2025).             This document  has been electronically signed by LINDSAY Gillette  January 26, 2025 13:48 EST    Please note that portions of this note were completed with a voice recognition program. Efforts were made to edit dictation, but occasionally words are mistranscribed.

## 2025-04-04 DIAGNOSIS — F33.1 MODERATE EPISODE OF RECURRENT MAJOR DEPRESSIVE DISORDER: ICD-10-CM

## 2025-04-04 DIAGNOSIS — F41.1 GENERALIZED ANXIETY DISORDER: ICD-10-CM

## 2025-04-04 RX ORDER — HYDROXYZINE PAMOATE 25 MG/1
25-50 CAPSULE ORAL 3 TIMES DAILY PRN
Qty: 180 CAPSULE | Refills: 2 | Status: SHIPPED | OUTPATIENT
Start: 2025-04-04

## 2025-04-04 RX ORDER — DESVENLAFAXINE 25 MG/1
25 TABLET, EXTENDED RELEASE ORAL DAILY
Qty: 30 TABLET | Refills: 1 | Status: SHIPPED | OUTPATIENT
Start: 2025-04-04

## 2025-04-04 RX ORDER — LORAZEPAM 0.5 MG/1
0.5 TABLET ORAL DAILY PRN
Qty: 15 TABLET | Refills: 0 | Status: SHIPPED | OUTPATIENT
Start: 2025-04-04

## 2025-04-04 NOTE — TELEPHONE ENCOUNTER
Rx Refill Note  Requested Prescriptions     Pending Prescriptions Disp Refills    Desvenlafaxine Succinate ER (Pristiq) 25 MG tablet sustained-release 24 hour 30 tablet 1     Sig: Take 1 tablet by mouth Daily.    hydrOXYzine pamoate (VISTARIL) 25 MG capsule 180 capsule 2     Sig: Take 1-2 capsules by mouth 3 (Three) Times a Day As Needed for Anxiety.    LORazepam (Ativan) 0.5 MG tablet 15 tablet 0     Sig: Take 1 tablet by mouth Daily As Needed for Anxiety.      Last office visit with prescribing clinician: Visit date not found   Last telemedicine visit with prescribing clinician: 12/18/2024   Next office visit with prescribing clinician: 4/23/2025                         Would you like a call back once the refill request has been completed: [] Yes [] No    If the office needs to give you a call back, can they leave a voicemail: [] Yes [] No    Inderjit Dumont MA  04/04/25, 10:38 EDT

## 2025-04-23 ENCOUNTER — TELEMEDICINE (OUTPATIENT)
Dept: PSYCHIATRY | Facility: CLINIC | Age: 45
End: 2025-04-23
Payer: MEDICAID

## 2025-04-23 DIAGNOSIS — F41.1 GENERALIZED ANXIETY DISORDER: ICD-10-CM

## 2025-04-23 DIAGNOSIS — F33.1 MODERATE EPISODE OF RECURRENT MAJOR DEPRESSIVE DISORDER: Primary | ICD-10-CM

## 2025-04-23 NOTE — PROGRESS NOTES
Mode of Visit: Video   Location of patient: -HOME-   Location of provider: +[facility]+   You have chosen to receive care through a telehealth visit.   The patient has signed the video visit consent form.   The visit included audio and video interaction. No technical issues occurred during this visit.     Subjective   Chayito Parra is a 44 y.o. female who presents today for follow up    Chief Complaint:  Anxiety and depression    History of Present Illness:   History of Present Illness  Chayito Parra presents for medication management via MyChart video visit. Her last follow-up appointment was 12/18/2024.  Currently taking Pristiq 25 mg daily, hydroxyzine pamoate as needed and lorazepam as needed. Recently visited her daughter and did not have a good experience. Experiences depression with symptoms that can be worse on some days more than others. Rates depression 8/10 at worst and 2/10 at best (on a 0-10 scale with 10 being the worst. Also experiences anxiety that with symptoms that can be situational or occur without any triggers. Sleep has been broken, waking every two hours. Having more vivid dreams. Voices recent weight gain that has worsened since initiating Pristiq. Denies SI/HI. PHQ-9 total Score: 10, APRIL-7 total Score: 11.    Previous Psych Meds: Sertraline (over 15 years, stopped being effective), Trazodone, Buspirone (increased anxiety), Lexapro (not effective), Prozac (not effective), Effexor XR (weight gain), Clonazepam, Diazepam, Paxil (vivid dreams), Pristiq (weight gain)    The following portions of the patient's history were reviewed and updated as appropriate: allergies, current medications, past family history, past medical history, past social history, past surgical history and problem list.    Past Medical History:   Diagnosis Date    Anxiety     Depression     Hypertension     Migraine     Obsessive-compulsive disorder     Seizures      Social History     Socioeconomic History    Marital  status: Single   Tobacco Use    Smoking status: Never     Passive exposure: Never    Smokeless tobacco: Never   Vaping Use    Vaping status: Never Used   Substance and Sexual Activity    Alcohol use: No    Drug use: No    Sexual activity: Not Currently     Birth control/protection: Hysterectomy     Family History   Problem Relation Age of Onset    Depression Mother      Past Surgical History:   Procedure Laterality Date     SECTION      HYSTERECTOMY       Problem List:  There is no problem list on file for this patient.    Allergies   Allergen Reactions    Latex, Natural Rubber Itching      Current Outpatient Medications   Medication Sig Dispense Refill    atenolol-chlorthalidone (TENORETIC) 50-25 MG per tablet Take 0.5 tablets by mouth Daily.      carBAMazepine (TEGretol) 200 MG tablet Take 1 tablet by mouth Every 12 (Twelve) Hours.      cyclobenzaprine (FLEXERIL) 10 MG tablet Take 1 tablet by mouth 3 (Three) Times a Day.      hydrOXYzine pamoate (VISTARIL) 25 MG capsule Take 1-2 capsules by mouth 3 (Three) Times a Day As Needed for Anxiety. 180 capsule 2    LORazepam (Ativan) 0.5 MG tablet Take 1 tablet by mouth Daily As Needed for Anxiety. 15 tablet 0    nortriptyline (PAMELOR) 50 MG capsule Take 1 capsule by mouth Every Night.      sertraline (Zoloft) 50 MG tablet Take 1 tablet by mouth Daily. 30 tablet 2     No current facility-administered medications for this visit.     Review of Systems   Constitutional:  Negative for activity change, appetite change, unexpected weight gain and unexpected weight loss.   Respiratory:  Negative for shortness of breath.    Cardiovascular:  Negative for chest pain.   Psychiatric/Behavioral:  Positive for dysphoric mood and stress. Negative for sleep disturbance and suicidal ideas. The patient is nervous/anxious.      Physical Exam  Constitutional:       General: She is not in acute distress.     Appearance: Normal appearance.   Neurological:      Mental Status: She is  alert.     Vitals:   The patient was seen remotely today via a MyChart Video Visit through [facility]. Unable to obtain vital signs due to nature of remote visit.    Mental Status Exam:   Hygiene:    Appears good  Cooperation:  Cooperative  Eye Contact:   NATALIE  Psychomotor Behavior:  Appropriate  Affect:  Full range and Appropriate  Mood: euthymic  Hopelessness: Denies  Speech:  Normal  Thought Process:  Goal directed and Linear  Thought Content:  Mood congruent  Suicidal:  None  Homicidal:  None  Hallucinations:  None  Delusion:  None  Memory:  Intact  Orientation:  Person, Place, Time, and Situation  Reliability:  good  Insight:  Good  Judgement:  Good  Impulse Control:  Good    Assessment & Plan   Problems Addressed this Visit    None  Visit Diagnoses         Moderate episode of recurrent major depressive disorder    -  Primary    Relevant Medications    sertraline (Zoloft) 50 MG tablet      Generalized anxiety disorder        Relevant Medications    sertraline (Zoloft) 50 MG tablet          Diagnoses         Codes Comments      Moderate episode of recurrent major depressive disorder    -  Primary ICD-10-CM: F33.1  ICD-9-CM: 296.32       Generalized anxiety disorder     ICD-10-CM: F41.1  ICD-9-CM: 300.02           Visit Diagnoses:    ICD-10-CM ICD-9-CM   1. Moderate episode of recurrent major depressive disorder  F33.1 296.32   2. Generalized anxiety disorder  F41.1 300.02     Chayito present for medication management. She is still having symptoms of depression and anxiety. She is not obtaining benefit with current medication and attributes recent weight gain to medication. Discussed medication options. She previously did well with Sertraline, agreeable to retrial medication since she did not experience any adverse effects. Will stop Pristiq and start Sertraline using cross taper schedule. Discussed schedule and will provide written instruction for medication dosing over next four weeks. Will continue with Hydroxyzine  Pamoate 25-50 mg three times daily as needed and Lorazepam 0.5 mg daily as needed for severe anxiety/panic.    -Start Sertraline 25 mg daily x 2 weeks then increase to 50 mg daily based on cross taper schedule provided  -Stop Pristiq, taper as discussed over next 3 weeks. Provided written instruction for taper schedule   -Continue Lorazepam 0.5 mg daily as needed (#15 provided for 30 days supply to use only in severe anxiety/panic)  -Continue hydroxyzine 25-50 mg 3 times daily as needed    -Reviewed previous available documentation and most recent available labs. MARINO reviewed per PDMP and is appropriate.     GOALS:  Short Term Goals: Patient will be compliant with medication, and patient will have no significant medication related side effects.  Patient will be engaged in psychotherapy as indicated.  Patient will report subjective improvement of symptoms.  Long term goals: To stabilize mood and treat/improve subjective symptoms, the patient will stay out of the hospital, the patient will be at an optimal level of functioning, and the patient will take all medications as prescribed.  The patient/guardian verbalized understanding and agreement with goals that were mutually set.    TREATMENT PLAN: Continue supportive psychotherapy efforts and medications as indicated for patient's diagnosis.  Pharmacological and Non-Pharmacological treatment options discussed during today's visit. Patient/Guardian acknowledged and verbally consented with current treatment plan and was educated on the importance of compliance with treatment and follow-up appointments.      MEDICATION ISSUES:  Discussed medication options and treatment plan of prescribed medication as well as the risks, benefits, any black box warnings, and side effects including potential falls, possible impaired driving, and metabolic adversities among others. Patient is agreeable to call the office with any worsening of symptoms or onset of side effects, or if any  concerns or questions arise.  The contact information for the office is made available to the patient. Patient is agreeable to call 911 or go to the nearest ER should they begin having any SI/HI, or if any urgent concerns arise. No medication side effects or related complaints today.     MEDS ORDERED DURING VISIT:  New Medications Ordered This Visit   Medications    sertraline (Zoloft) 50 MG tablet     Sig: Take 1 tablet by mouth Daily.     Dispense:  30 tablet     Refill:  2     FOLLOW UP:  Return in about 6 weeks (around 6/4/2025).             This document has been electronically signed by LINDSAY Gillette  May 7, 2025 22:35 EDT    Please note that portions of this note were completed with a voice recognition program. Efforts were made to edit dictation, but occasionally words are mistranscribed.

## 2025-06-04 ENCOUNTER — TELEMEDICINE (OUTPATIENT)
Dept: PSYCHIATRY | Facility: CLINIC | Age: 45
End: 2025-06-04
Payer: MEDICAID

## 2025-06-04 DIAGNOSIS — F33.1 MODERATE EPISODE OF RECURRENT MAJOR DEPRESSIVE DISORDER: Primary | ICD-10-CM

## 2025-06-04 DIAGNOSIS — F41.1 GENERALIZED ANXIETY DISORDER: ICD-10-CM

## 2025-06-04 NOTE — PROGRESS NOTES
Mode of Visit: Video   Location of patient: -HOME-   Location of provider: +[facility]+   You have chosen to receive care through a telehealth visit.   The patient has signed the video visit consent form.   The visit included audio and video interaction. No technical issues occurred during this visit.     Subjective   Chayito Parra is a 44 y.o. female who presents today for follow up    Chief Complaint:  Anxiety and depression    History of Present Illness:   History of Present Illness  Chayito Parra presents for medication management. Her last follow-up appointment was/23/25. Currently taking Zoloft 50 mg daily and voices no significant changes in mood with medication. Continues to experience symptoms of depression along with anxiety at baseline. Level of depression typically varies. Reported symptoms of depression include no motivation, low mood, decreased energy, sleep issues and poor concentration. Feels that she often wants to do things, but not motivated to actually do them. Symptoms of anxiety include feeling nervous, anxious, on edge, worry, trouble relaxing, easily annoyed/irritable and sometimes feeling as though something bad might happen. Rates overall symptoms of depression 7/10, rates anxiety 7/10 on a 0-10 scale with 10 being the worst. Still having trouble with sleep, amount varies. Denies SI/HI. PHQ-9 total Score: 6 (previously 10), APRIL-7 total Score: 6 (previously 11).    Previous Psych Meds: Sertraline (over 15 years, stopped being effective), Trazodone, Buspirone (increased anxiety), Lexapro (not effective), Prozac (not effective), Effexor XR (weight gain), Clonazepam, Diazepam, Paxil (vivid dreams), Pristiq (weight gain)    The following portions of the patient's history were reviewed and updated as appropriate: allergies, current medications, past family history, past medical history, past social history, past surgical history and problem list.    Past Medical History:   Diagnosis Date     Anxiety     Depression     Hypertension     Migraine     Obsessive-compulsive disorder     Seizures      Social History     Socioeconomic History    Marital status: Single   Tobacco Use    Smoking status: Never     Passive exposure: Never    Smokeless tobacco: Never   Vaping Use    Vaping status: Never Used   Substance and Sexual Activity    Alcohol use: No    Drug use: No    Sexual activity: Not Currently     Birth control/protection: Hysterectomy     Family History   Problem Relation Age of Onset    Depression Mother      Past Surgical History:   Procedure Laterality Date     SECTION      HYSTERECTOMY       Problem List:  There is no problem list on file for this patient.    Allergies   Allergen Reactions    Latex, Natural Rubber Itching      Current Outpatient Medications   Medication Sig Dispense Refill    atenolol-chlorthalidone (TENORETIC) 50-25 MG per tablet Take 0.5 tablets by mouth Daily.      carBAMazepine (TEGretol) 200 MG tablet Take 1 tablet by mouth Every 12 (Twelve) Hours.      cyclobenzaprine (FLEXERIL) 10 MG tablet Take 1 tablet by mouth 3 (Three) Times a Day.      hydrOXYzine pamoate (VISTARIL) 25 MG capsule Take 1-2 capsules by mouth 3 (Three) Times a Day As Needed for Anxiety. 180 capsule 2    LORazepam (Ativan) 0.5 MG tablet Take 1 tablet by mouth Daily As Needed for Anxiety. 15 tablet 0    nortriptyline (PAMELOR) 50 MG capsule Take 1 capsule by mouth Every Night.      Vortioxetine HBr (Trintellix) 5 MG tablet tablet Take 1 tablet by mouth Daily With Breakfast. 30 tablet 0     No current facility-administered medications for this visit.     Review of Systems   Constitutional:  Negative for activity change, appetite change, unexpected weight gain and unexpected weight loss.   Respiratory:  Negative for shortness of breath.    Cardiovascular:  Negative for chest pain.   Psychiatric/Behavioral:  Positive for dysphoric mood and stress. Negative for sleep disturbance and suicidal ideas. The  patient is nervous/anxious.      Physical Exam  Constitutional:       General: She is not in acute distress.     Appearance: Normal appearance.   Neurological:      Mental Status: She is alert.     Vitals:   The patient was seen remotely today via a MyChart Video Visit through [facility]. Unable to obtain vital signs due to nature of remote visit.    Mental Status Exam:   Hygiene:   Appears good  Cooperation:  Cooperative  Eye Contact:  NATALIE  Psychomotor Behavior:  Appropriate  Affect:  Full range and Appropriate  Mood: euthymic  Hopelessness: Denies  Speech:  Normal  Thought Process:  Goal directed and Linear  Thought Content:  Mood congruent  Suicidal:  None  Homicidal:  None  Hallucinations:  None  Delusion:  None  Memory:  Intact  Orientation:  Person, Place, Time, and Situation  Reliability:  good  Insight:  Good  Judgement:  Good  Impulse Control:  Good    Assessment & Plan   Problems Addressed this Visit    None  Visit Diagnoses         Moderate episode of recurrent major depressive disorder    -  Primary    Relevant Medications    Vortioxetine HBr (Trintellix) 5 MG tablet tablet      Generalized anxiety disorder        Relevant Medications    Vortioxetine HBr (Trintellix) 5 MG tablet tablet          Diagnoses         Codes Comments      Moderate episode of recurrent major depressive disorder    -  Primary ICD-10-CM: F33.1  ICD-9-CM: 296.32       Generalized anxiety disorder     ICD-10-CM: F41.1  ICD-9-CM: 300.02           Visit Diagnoses:    ICD-10-CM ICD-9-CM   1. Moderate episode of recurrent major depressive disorder  F33.1 296.32   2. Generalized anxiety disorder  F41.1 300.02     Today's visit is for medication management. She continues to experience symptoms of depression with most bothersome being no motivation and low energy. Also having anxiety that has been present at baseline. Has noticed no significant improvement in mood with current medication regimen. Discussed other medication options, will  taper/discontinue sertraline and start Trintellix 5 mg daily. Continue with hydroxyzine and lorazepam 0.5 mg daily to use only as needed for severe episodes of anxiety/panic (last filled 4/4/2025 #15 for 30-day supply per [facility]).    -Agreeable to [employer], test kit to be mailed.   -Discontinue Sertraline, taper from 50 mg daily to 25 mg every other day x 1 week then stop  -Start Trintellix 5 mg daily  -Continue Lorazepam 0.5 mg daily as needed (#15 provided for 30 days supply to use only in severe anxiety/panic)  -Continue hydroxyzine 25-50 mg 3 times daily as needed    -Reviewed previous available documentation and most recent available labs. MARINO reviewed per PDMP and is appropriate.     GOALS:  Short Term Goals: Patient will be compliant with medication, and patient will have no significant medication related side effects.  Patient will be engaged in psychotherapy as indicated.  Patient will report subjective improvement of symptoms.  Long term goals: To stabilize mood and treat/improve subjective symptoms, the patient will stay out of the hospital, the patient will be at an optimal level of functioning, and the patient will take all medications as prescribed.  The patient/guardian verbalized understanding and agreement with goals that were mutually set.    TREATMENT PLAN: Continue supportive psychotherapy efforts and medications as indicated for patient's diagnosis.  Pharmacological and Non-Pharmacological treatment options discussed during today's visit. Patient/Guardian acknowledged and verbally consented with current treatment plan and was educated on the importance of compliance with treatment and follow-up appointments.      MEDICATION ISSUES:  Discussed medication options and treatment plan of prescribed medication as well as the risks, benefits, any black box warnings, and side effects including potential falls, possible impaired driving, and metabolic adversities among others. Patient is agreeable to  call the office with any worsening of symptoms or onset of side effects, or if any concerns or questions arise.  The contact information for the office is made available to the patient. Patient is agreeable to call 911 or go to the nearest ER should they begin having any SI/HI, or if any urgent concerns arise. No medication side effects or related complaints today.     MEDS ORDERED DURING VISIT:  New Medications Ordered This Visit   Medications    Vortioxetine HBr (Trintellix) 5 MG tablet tablet     Sig: Take 1 tablet by mouth Daily With Breakfast.     Dispense:  30 tablet     Refill:  0     FOLLOW UP:  Return in about 4 weeks (around 7/2/2025) for Recheck, Video visit.           This document has been electronically signed by LINDSAY Gillette  June 18, 2025 23:30 EDT    Please note that portions of this note were completed with a voice recognition program. Efforts were made to edit dictation, but occasionally words are mistranscribed.

## 2025-06-06 ENCOUNTER — TELEPHONE (OUTPATIENT)
Dept: PSYCHIATRY | Facility: CLINIC | Age: 45
End: 2025-06-06
Payer: MEDICAID

## 2025-07-02 ENCOUNTER — TELEMEDICINE (OUTPATIENT)
Dept: PSYCHIATRY | Facility: CLINIC | Age: 45
End: 2025-07-02
Payer: MEDICAID

## 2025-07-02 DIAGNOSIS — F41.1 GENERALIZED ANXIETY DISORDER: Primary | ICD-10-CM

## 2025-07-02 DIAGNOSIS — F33.1 MODERATE EPISODE OF RECURRENT MAJOR DEPRESSIVE DISORDER: ICD-10-CM

## 2025-07-02 RX ORDER — BUSPIRONE HYDROCHLORIDE 5 MG/1
5 TABLET ORAL 3 TIMES DAILY
Qty: 90 TABLET | Refills: 0 | Status: SHIPPED | OUTPATIENT
Start: 2025-07-02

## 2025-07-02 NOTE — PROGRESS NOTES
Mode of Visit: Video   Location of patient: -HOME-   Location of provider[place] [facility]   You have chosen to receive care through a telehealth visit.   The patient has signed the video visit consent form.   The visit included audio and video interaction. No technical issues occurred during this visit.     Subjective   Chayito Parra is a 44 y.o. female who presents today for follow up    Chief Complaint:  Anxiety and depression    History of Present Illness:   History of Present Illness  Chayito Parra presents for medication management via MyChart video visit. Her last follow-up appointment was 6/4/2025. Currently taking sertraline 50 mg daily, medication started last visit after taking medication for several years in the past with benefit. Voices that she has not experienced any decrease in symptoms. Still having high levels of anxiety with symptoms that include feeling nervous, anxious, on edge, worry, trouble relaxing, restlessness, being easily annoyed/irritable and fearful that something awful might happen. Having constant racing thoughts, thinking about past experiences in things that happened several years ago. Continues to have symptoms of depression that include little interest/pleasure in doing things, feeling down, depressed, hopeless, sleep issues, low energy, appetite changes, feeling bad about self and trouble concentrating. Rates overall symptoms of depression 7/10, rates anxiety 8-9/10 on a 0-10 scale with 10 being the worst. Has trouble falling asleep at night due to racing thoughts. Denies SI/HI. PHQ-9 total Score: 7 (previously 6), APRIL-7 total Score: 6 (previously 6).    Previous Psych Meds: Sertraline (over 15 years, stopped being effective), Trazodone, Buspirone (increased anxiety), Lexapro (not effective), Prozac (not effective), Effexor XR (weight gain), Clonazepam, Diazepam, Paxil (vivid dreams), Pristiq (weight gain)    The following portions of the patient's history were reviewed and  updated as appropriate: allergies, current medications, past family history, past medical history, past social history, past surgical history and problem list.    Past Medical History:   Diagnosis Date    Anxiety     Depression     Hypertension     Migraine     Obsessive-compulsive disorder     Seizures      Social History     Socioeconomic History    Marital status: Single   Tobacco Use    Smoking status: Never     Passive exposure: Never    Smokeless tobacco: Never   Vaping Use    Vaping status: Never Used   Substance and Sexual Activity    Alcohol use: No    Drug use: No    Sexual activity: Not Currently     Birth control/protection: Hysterectomy     Family History   Problem Relation Age of Onset    Depression Mother      Past Surgical History:   Procedure Laterality Date     SECTION      HYSTERECTOMY       Problem List:  There is no problem list on file for this patient.    Allergies   Allergen Reactions    Latex, Natural Rubber Itching      Current Outpatient Medications   Medication Sig Dispense Refill    atenolol-chlorthalidone (TENORETIC) 50-25 MG per tablet Take 0.5 tablets by mouth Daily.      busPIRone (BUSPAR) 5 MG tablet Take 1 tablet by mouth 3 (Three) Times a Day. 90 tablet 0    carBAMazepine (TEGretol) 200 MG tablet Take 1 tablet by mouth Every 12 (Twelve) Hours.      cyclobenzaprine (FLEXERIL) 10 MG tablet Take 1 tablet by mouth 3 (Three) Times a Day.      hydrOXYzine pamoate (VISTARIL) 25 MG capsule Take 1-2 capsules by mouth 3 (Three) Times a Day As Needed for Anxiety. 180 capsule 2    LORazepam (Ativan) 0.5 MG tablet Take 1 tablet by mouth Daily As Needed for Anxiety. 15 tablet 0    nortriptyline (PAMELOR) 50 MG capsule Take 1 capsule by mouth Every Night.      Vortioxetine HBr (Trintellix) 5 MG tablet tablet Take 1 tablet by mouth Daily With Breakfast. (Patient not taking: Reported on 2025) 30 tablet 0     No current facility-administered medications for this visit.     Review of  Systems   Constitutional:  Negative for activity change, appetite change, unexpected weight gain and unexpected weight loss.   Respiratory:  Negative for shortness of breath.    Cardiovascular:  Negative for chest pain.   Psychiatric/Behavioral:  Positive for dysphoric mood and stress. Negative for sleep disturbance and suicidal ideas. The patient is nervous/anxious.      Physical Exam  Constitutional:       General: She is not in acute distress.     Appearance: Normal appearance.   Neurological:      Mental Status: She is alert.     Vitals:   The patient was seen remotely today via a MyChart Video Visit through [facility]. Unable to obtain vital signs due to nature of remote visit.    Mental Status Exam:   Hygiene:   Appears good  Cooperation:  Cooperative  Eye Contact:  NATALIE  Psychomotor Behavior:  Appropriate  Affect:  Full range and Appropriate  Mood: euthymic  Hopelessness: Denies  Speech:  Normal  Thought Process:  Goal directed and Linear  Thought Content:  Mood congruent  Suicidal:  None  Homicidal:  None  Hallucinations:  None  Delusion:  None  Memory:  Intact  Orientation:  Person, Place, Time, and Situation  Reliability:  good  Insight:  Good  Judgement:  Good  Impulse Control:  Good    Assessment & Plan   Problems Addressed this Visit    None  Visit Diagnoses         Generalized anxiety disorder    -  Primary    Relevant Medications    busPIRone (BUSPAR) 5 MG tablet      Moderate episode of recurrent major depressive disorder        Relevant Medications    busPIRone (BUSPAR) 5 MG tablet          Diagnoses         Codes Comments      Generalized anxiety disorder    -  Primary ICD-10-CM: F41.1  ICD-9-CM: 300.02       Moderate episode of recurrent major depressive disorder     ICD-10-CM: F33.1  ICD-9-CM: 296.32           Visit Diagnoses:    ICD-10-CM ICD-9-CM   1. Generalized anxiety disorder  F41.1 300.02   2. Moderate episode of recurrent major depressive disorder  F33.1 296.32     Today's visit is for  medication management. She continues to struggle with higher levels of anxiety as well as symptoms of depression. Has not felt that she has obtained any type of benefit since restarting medication. Voices interest in tapering medication and starting a different medication. Discussed other possible medication options along with option for [employer] to serve as tool aid in medication selection. Will have [employer] test mailed for her to obtain and mail back to company. Will taper/discontinue Sertraline and start Trintellix 5 mg daily, continue Lorazepam only as needed in episodes of severe anxiety along with Hydroxyzine as needed.     -Agreeable to [employer], test kit to be mailed.   -Discontinue Sertraline, taper from 50 mg daily to 25 mg every other day x 1 week then stop  -Start Trintellix 5 mg daily  -Continue Lorazepam 0.5 mg daily as needed (#15 provided for 30 days supply to use only in severe anxiety/panic)  -Continue hydroxyzine 25-50 mg 3 times daily as needed    -Reviewed previous available documentation and most recent available labs. MARINO reviewed per PDMP and is appropriate.     GOALS:  Short Term Goals: Patient will be compliant with medication, and patient will have no significant medication related side effects.  Patient will be engaged in psychotherapy as indicated.  Patient will report subjective improvement of symptoms.  Long term goals: To stabilize mood and treat/improve subjective symptoms, the patient will stay out of the hospital, the patient will be at an optimal level of functioning, and the patient will take all medications as prescribed.  The patient/guardian verbalized understanding and agreement with goals that were mutually set.    TREATMENT PLAN: Continue supportive psychotherapy efforts and medications as indicated for patient's diagnosis.  Pharmacological and Non-Pharmacological treatment options discussed during today's visit. Patient/Guardian acknowledged and verbally consented  with current treatment plan and was educated on the importance of compliance with treatment and follow-up appointments.      MEDICATION ISSUES:  Discussed medication options and treatment plan of prescribed medication as well as the risks, benefits, any black box warnings, and side effects including potential falls, possible impaired driving, and metabolic adversities among others. Patient is agreeable to call the office with any worsening of symptoms or onset of side effects, or if any concerns or questions arise.  The contact information for the office is made available to the patient. Patient is agreeable to call 911 or go to the nearest ER should they begin having any SI/HI, or if any urgent concerns arise. No medication side effects or related complaints today.     MEDS ORDERED DURING VISIT:  New Medications Ordered This Visit   Medications    busPIRone (BUSPAR) 5 MG tablet     Sig: Take 1 tablet by mouth 3 (Three) Times a Day.     Dispense:  90 tablet     Refill:  0     FOLLOW UP:  Return in about 4 weeks (around 7/30/2025) for Recheck.           This document has been electronically signed by LINDSAY Gillette  July 16, 2025 23:50 EDT    Please note that portions of this note were completed with a voice recognition program. Efforts were made to edit dictation, but occasionally words are mistranscribed.

## 2025-07-26 DIAGNOSIS — F41.1 GENERALIZED ANXIETY DISORDER: ICD-10-CM

## 2025-07-28 RX ORDER — BUSPIRONE HYDROCHLORIDE 5 MG/1
5 TABLET ORAL 3 TIMES DAILY
Qty: 90 TABLET | Refills: 0 | Status: SHIPPED | OUTPATIENT
Start: 2025-07-28 | End: 2025-07-31 | Stop reason: SDUPTHER

## 2025-07-31 ENCOUNTER — TELEMEDICINE (OUTPATIENT)
Dept: PSYCHIATRY | Facility: CLINIC | Age: 45
End: 2025-07-31
Payer: MEDICAID

## 2025-07-31 DIAGNOSIS — F33.1 MODERATE EPISODE OF RECURRENT MAJOR DEPRESSIVE DISORDER: ICD-10-CM

## 2025-07-31 DIAGNOSIS — F41.1 GENERALIZED ANXIETY DISORDER: Primary | ICD-10-CM

## 2025-07-31 RX ORDER — LORAZEPAM 0.5 MG/1
.25-.5 TABLET ORAL DAILY PRN
Qty: 15 TABLET | Refills: 0 | Status: SHIPPED | OUTPATIENT
Start: 2025-07-31

## 2025-07-31 RX ORDER — BUSPIRONE HYDROCHLORIDE 5 MG/1
5 TABLET ORAL 3 TIMES DAILY
Qty: 90 TABLET | Refills: 1 | Status: SHIPPED | OUTPATIENT
Start: 2025-07-31

## 2025-08-05 DIAGNOSIS — F33.1 MAJOR DEPRESSIVE DISORDER, RECURRENT, MODERATE: ICD-10-CM
